# Patient Record
Sex: FEMALE | Race: WHITE | NOT HISPANIC OR LATINO | Employment: OTHER | URBAN - METROPOLITAN AREA
[De-identification: names, ages, dates, MRNs, and addresses within clinical notes are randomized per-mention and may not be internally consistent; named-entity substitution may affect disease eponyms.]

---

## 2017-08-18 ENCOUNTER — HISTORICAL (OUTPATIENT)
Dept: ADMINISTRATIVE | Facility: HOSPITAL | Age: 62
End: 2017-08-18

## 2017-08-18 LAB
ABS NEUT (OLG): 3.99 X10(3)/MCL (ref 2.1–9.2)
ALBUMIN SERPL-MCNC: 3.6 GM/DL (ref 3.4–5)
ALBUMIN/GLOB SERPL: 1.1 RATIO (ref 1.1–2)
ALP SERPL-CCNC: 53 UNIT/L (ref 38–126)
ALT SERPL-CCNC: 19 UNIT/L (ref 12–78)
APPEARANCE, UA: CLEAR
AST SERPL-CCNC: 15 UNIT/L (ref 15–37)
BACTERIA SPEC CULT: ABNORMAL /HPF
BASOPHILS # BLD AUTO: 0 X10(3)/MCL (ref 0–0.2)
BASOPHILS NFR BLD AUTO: 0 %
BILIRUB SERPL-MCNC: 0.6 MG/DL (ref 0.2–1)
BILIRUB UR QL STRIP: NEGATIVE
BILIRUBIN DIRECT+TOT PNL SERPL-MCNC: 0.1 MG/DL (ref 0–0.5)
BILIRUBIN DIRECT+TOT PNL SERPL-MCNC: 0.5 MG/DL (ref 0–0.8)
BUN SERPL-MCNC: 21 MG/DL (ref 7–18)
CALCIUM SERPL-MCNC: 8.8 MG/DL (ref 8.5–10.1)
CHLORIDE SERPL-SCNC: 109 MMOL/L (ref 98–107)
CHOLEST SERPL-MCNC: 223 MG/DL (ref 0–200)
CHOLEST/HDLC SERPL: 2.9 {RATIO} (ref 0–4)
CO2 SERPL-SCNC: 26 MMOL/L (ref 21–32)
COLOR UR: ABNORMAL
CREAT SERPL-MCNC: 1.11 MG/DL (ref 0.55–1.02)
CRP SERPL HS-MCNC: 11.3 MG/L (ref 0–3)
DEPRECATED CALCIDIOL+CALCIFEROL SERPL-MC: 31.53 NG/ML (ref 30–80)
EOSINOPHIL # BLD AUTO: 0.1 X10(3)/MCL (ref 0–0.9)
EOSINOPHIL NFR BLD AUTO: 2 %
ERYTHROCYTE [DISTWIDTH] IN BLOOD BY AUTOMATED COUNT: 14 % (ref 11.5–17)
EST. AVERAGE GLUCOSE BLD GHB EST-MCNC: 103 MG/DL
FOLATE SERPL-MCNC: 23.6 NG/ML (ref 3.1–17.5)
GLOBULIN SER-MCNC: 3.4 GM/DL (ref 2.4–3.5)
GLUCOSE (UA): NEGATIVE
GLUCOSE SERPL-MCNC: 90 MG/DL (ref 74–106)
HBA1C MFR BLD: 5.2 % (ref 4.2–6.3)
HCT VFR BLD AUTO: 43.1 % (ref 37–47)
HDLC SERPL-MCNC: 76 MG/DL (ref 35–60)
HGB BLD-MCNC: 14.5 GM/DL (ref 12–16)
HGB UR QL STRIP: NEGATIVE
IRON SATN MFR SERPL: 31.3 % (ref 20–50)
IRON SERPL-MCNC: 94 MCG/DL (ref 50–175)
KETONES UR QL STRIP: ABNORMAL
LDLC SERPL CALC-MCNC: 128 MG/DL (ref 0–129)
LEUKOCYTE ESTERASE UR QL STRIP: ABNORMAL
LYMPHOCYTES # BLD AUTO: 1.1 X10(3)/MCL (ref 0.6–4.6)
LYMPHOCYTES NFR BLD AUTO: 19 %
MCH RBC QN AUTO: 34.1 PG (ref 27–31)
MCHC RBC AUTO-ENTMCNC: 33.6 GM/DL (ref 33–36)
MCV RBC AUTO: 101.4 FL (ref 80–94)
MONOCYTES # BLD AUTO: 0.4 X10(3)/MCL (ref 0.1–1.3)
MONOCYTES NFR BLD AUTO: 8 %
NEUTROPHILS # BLD AUTO: 3.99 X10(3)/MCL (ref 1.4–7.9)
NEUTROPHILS NFR BLD AUTO: 70 %
NITRITE UR QL STRIP: NEGATIVE
PH UR STRIP: 6 [PH] (ref 5–9)
PLATELET # BLD AUTO: 232 X10(3)/MCL (ref 130–400)
PMV BLD AUTO: 9.1 FL (ref 9.4–12.4)
POTASSIUM SERPL-SCNC: 3.9 MMOL/L (ref 3.5–5.1)
PROT SERPL-MCNC: 7 GM/DL (ref 6.4–8.2)
PROT UR QL STRIP: NEGATIVE
RBC # BLD AUTO: 4.25 X10(6)/MCL (ref 4.2–5.4)
RBC #/AREA URNS HPF: 13 /HPF (ref 0–2)
SODIUM SERPL-SCNC: 144 MMOL/L (ref 136–145)
SP GR UR STRIP: 1.03 (ref 1–1.03)
SQUAMOUS EPITHELIAL, UA: ABNORMAL
T3FREE SERPL-MCNC: 2.48 PG/ML (ref 2.18–3.98)
T4 FREE SERPL-MCNC: 1.05 NG/DL (ref 0.76–1.46)
TIBC SERPL-MCNC: 300 MCG/DL (ref 250–450)
TRANSFERRIN SERPL-MCNC: 249 MG/DL (ref 200–360)
TRIGL SERPL-MCNC: 97 MG/DL (ref 30–150)
TSH SERPL-ACNC: 5.13 MIU/ML (ref 0.36–3.74)
UROBILINOGEN UR STRIP-ACNC: 0.2
VIT B12 SERPL-MCNC: 2329 PG/ML (ref 193–986)
VLDLC SERPL CALC-MCNC: 19 MG/DL
WBC # SPEC AUTO: 5.7 X10(3)/MCL (ref 4.5–11.5)
WBC #/AREA URNS HPF: 41 /HPF (ref 0–3)

## 2017-08-20 LAB — FINAL CULTURE: NO GROWTH

## 2017-08-30 ENCOUNTER — HISTORICAL (OUTPATIENT)
Dept: RADIOLOGY | Facility: HOSPITAL | Age: 62
End: 2017-08-30

## 2017-08-31 ENCOUNTER — TELEPHONE (OUTPATIENT)
Dept: HEMATOLOGY/ONCOLOGY | Facility: CLINIC | Age: 62
End: 2017-08-31

## 2017-08-31 NOTE — TELEPHONE ENCOUNTER
Spoke with patient's  (who is also a patient of Dr Rios).  Told him I tried calling Dr MIRTA Barron's office but no answer.  Will fax records request using fax # on website.   will try to get copies of records also.  (He was told [employer] has to send Release form).    The couple resides in [place] but willing to come here for tx.  They have relatives who live closeby. Wife dx'd with breast ca in approx 2013 & had lumpectomy.  She then was put on tamoxifen but was dc'd as she was not able to tolerate it.  They are worried that she is not on any tx.  Oncologist is not always accessible & that is why they want to transfer care.    Will continue working on getting records & keep in touch with patient.  =================================================    ----- Message from Ilda Frost sent at 8/31/2017 10:31 AM CDT -----  Contact: Spouse  I see someone outside our dept had scheduled this consult with Dr. Rios on 9/14 at [facility].   Pt spouse stated that [facility] has to obtain medical records from [place] oncologist.   Informed pt spouse would get him in touch with one of our nurse navigators.   Thanks!    ----- Message -----  From: Mario Hammond  Sent: 8/31/2017   9:41 AM  To: [facility] Staff    Needs a request to release medical records     Anderson Severino MD:: [phone]    Pt contact::[phone]

## 2017-08-31 NOTE — TELEPHONE ENCOUNTER
Returned call to pt spouse.   Spouse stated that his wife is scheduled for a consult with Dr. Rios on 9/14 and that [place] oncologist stated [facility] has to obtain records for this appt.   Informed spouse that would send a message to our nurse navigator to help assist.   Spouse verbalized understanding.       ----- Message from Mario Hammond sent at 8/31/2017  9:41 AM CDT -----  Contact: Spouse  Needs a request to release medical records     Anderson Severino MD:: [phone]    Pt contact::[phone]

## 2017-08-31 NOTE — LETTER
Fax Transmission                                                                                                                                                       Date: 2017       To:  [facility]         (Dr Chancellor Barron) From:  Estrellita Fabian RN              Oncology Navigator   Fax:  [phone] Fax:    [phone]   Phone:  [phone] Phone: [phone]       This is a request for medical records of   JERMAINE CRESPO 1955.                              If there are any problems with this transmission, please call immediately. Thank you.    Confidentiality notice: The accompanying facsimile is intended solely for the use of the recipient designated above. Document(s) transmitted herewith may contain information that is confidential and privileged. Delivery, distribution or dissemination of this communication other than to the intended recipient is strictly prohibited. If you have received this facsimile in error, please notify [facility]'s Corporate Integrity Department immediately by telephone at [phone].

## 2017-09-12 ENCOUNTER — TELEPHONE (OUTPATIENT)
Dept: HEMATOLOGY/ONCOLOGY | Facility: CLINIC | Age: 62
End: 2017-09-12

## 2017-09-12 NOTE — TELEPHONE ENCOUNTER
9/12/17      Informed Omar that Dr Barron's office called today and they were going to fax me whatever records they have but may need more data from Faby Jimenez [phone].  Omar stated he has copy of op report and discs of all mammograms & other radiology.  He will fax me what he has.  Will reassess tomorrow how much data we have.    =====================================  ----- Message from Tiffanie Balderas sent at 9/12/2017 11:25 AM CDT -----  Contact: spouse  Pt contact [phone]    Pt spoke with Ms Haro    Pt will be seen as a New pt on 09/14 by Dr Rios and the  will like to know if Medical Records were received and if not he has them and can bring them today if necessary     Pt also states he is 3hrs away but he will drive to bring them here     Please inform pt

## 2017-09-13 ENCOUNTER — TELEPHONE (OUTPATIENT)
Dept: HEMATOLOGY/ONCOLOGY | Facility: CLINIC | Age: 62
End: 2017-09-13

## 2017-09-13 NOTE — TELEPHONE ENCOUNTER
Informed Omar that so far we received mammograms [id number],  Op & path report 2014, March visit notes & 9/12/17 lab.  He will bring the radiology discs to Friday's visit.  Scanned these to system.  I faxed records request to Faby Jimenez this morning.

## 2017-09-13 NOTE — PROGRESS NOTES
CC:  Transferring care for breast cancer    Diagnosis:  Stage IA ([id number]) multifocal invasive ductal carcinoma of the left breast diagnosed on 5/6/2014, ER/AL+, HER2 neg.     Treatment history:   1. Core needle biopsy of left breast lesion on 4/30/14 showed invasive ductal carcinoma, grade 2, ER/AL positive, HER2 negative.   2. Ms. Wakler underwent left lumpectomy + SLND on 5/6/2014. Pathology showed invasive ductal carcinoma, multifocal, largest 0.55 cm, grade 2, a second one 0.2 cm, a third one 0.1 cm, negative margin. DCIS (+margin). Four sentinel lymph nodes were removed and negative for malignancy.   3. Radiation of 61.2Gy to left breast 6/17/14-8/5/14.  4. Endocrine therapy with tamoxifen, letrozole and anastrozole, stopped due to severe myalgias  5. Most recently mammogram on 5/23/2017 showed bilateral breast implants, negative for malignancy.     HPI:  Ms. Walker is a very pleasant 61 yo  woman with HTN, GERD, asthma, history of early stage left breast cancer, who presents today to establish care with us. She was initially found to have an abnormal mamogram in April 2014 and underwent a core biopsy of the left breast lesion on 4/30/2014. Pathology showed grade 2 IDC, as per office note her tumor was ER/AL positive, HER2 negative. She then underwent left lumpectomy and sentinel lymph node biopsy on 5/6/2014.  Pathology showed invasive ductal carcinoma, multifocal, largest 0.55 cm, grade 2, a second one 0.2 cm, a third one 0.1 cm, negative margin. DCIS (+margin). Four sentinel lymph nodes were removed and negative for malignancy. She received adjuvant radiation from 6/17/14 to 8/5/14. Tamoxifen, letrozole, and anastrozole were tried in the past, all were stopped due to myalgias. She was on endocrine therapy for less than one year in total. She currently feels well, denies fever, chills, malaise, significant weight loss.  She is active and works as nurse.  She feels depressed and stressed because of  her 's illness. But denies SI/HI. She was taking Boniva for osteopenia before, but was taken off it because her most recent bone density test showed a good result.         Past Medical History:   Diagnosis Date    Allergy     Anxiety     Asthma     Breast cancer     CHF (congestive heart failure)     Depression     Herpes genitalis in women     Hypertension     Sleep apnea     Thyroid disease      PSH:  L lumpectomy in . B/L breast implants.     Family history:  Her maternal aunt had breast cancer diagnosed in her 50s. One maternal niece has breast cancer in her 60s.     OB/GYN:  . Last menses at age 57. Was on HRT from age 41 to age 57.     Social History     Social History    Marital status:      Spouse name: N/A    Number of children: N/A    Years of education: N/A     Occupational History    Not on file.     Social History Main Topics    Smoking status: Not on file    Smokeless tobacco: Not on file    Alcohol use Not on file    Drug use: Unknown    Sexual activity: Not on file     Other Topics Concern    Not on file     Social History Narrative    No narrative on file       Review of Systems   Constitutional: Negative for activity change, appetite change, chills, diaphoresis, fatigue, fever and unexpected weight change.   HENT: Negative for congestion, facial swelling, mouth sores, nosebleeds, tinnitus, trouble swallowing and voice change.    Eyes: Negative for pain, redness and visual disturbance.   Respiratory: Negative for cough, choking, chest tightness, shortness of breath, wheezing and stridor.    Cardiovascular: Negative for chest pain, palpitations and leg swelling.   Gastrointestinal: Negative for abdominal distention, abdominal pain, blood in stool, constipation, diarrhea, nausea and vomiting.   Endocrine: Negative for polydipsia, polyphagia and polyuria.   Genitourinary: Negative for difficulty urinating, flank pain, frequency and hematuria.    Musculoskeletal: Negative for arthralgias, back pain, joint swelling, myalgias, neck pain and neck stiffness.   Skin: Negative for color change, pallor and rash.   Neurological: Positive for headaches (occasional). Negative for dizziness, tremors, syncope, weakness, light-headedness and numbness.   Hematological: Negative for adenopathy. Does not bruise/bleed easily.   Psychiatric/Behavioral: Positive for dysphoric mood. Negative for agitation, confusion and self-injury. The patient is not nervous/anxious.        Objective:  Physical Exam   Constitutional: She is oriented to person, place, and time. She appears well-developed and well-nourished. No distress.   HENT:   Head: Normocephalic and atraumatic.   Mouth/Throat: Oropharynx is clear and moist. No oropharyngeal exudate.   Eyes: Conjunctivae and EOM are normal. Pupils are equal, round, and reactive to light. No scleral icterus.   Neck: Normal range of motion. Neck supple. No JVD present. No thyromegaly present.   Cardiovascular: Normal rate, regular rhythm, normal heart sounds and intact distal pulses.  Exam reveals no gallop and no friction rub.    No murmur heard.  Pulmonary/Chest: Effort normal and breath sounds normal. No respiratory distress. She has no wheezes. She has no rales. Right breast exhibits no inverted nipple, no mass, no nipple discharge, no skin change and no tenderness. Left breast exhibits no inverted nipple, no mass, no nipple discharge, no skin change and no tenderness. Breasts are symmetrical.   Abdominal: Soft. Bowel sounds are normal. She exhibits no distension and no mass. There is no hepatosplenomegaly. There is no tenderness. There is no rebound. No hernia.   Musculoskeletal: Normal range of motion. She exhibits no edema, tenderness or deformity.   Lymphadenopathy:     She has no cervical adenopathy.        Right axillary: No pectoral and no lateral adenopathy present.        Left axillary: No pectoral and no lateral adenopathy  present.       Right: No supraclavicular adenopathy present.        Left: No supraclavicular adenopathy present.   Neurological: She is alert and oriented to person, place, and time. No cranial nerve deficit. She exhibits normal muscle tone.   Skin: Skin is warm and dry. No rash noted. She is not diaphoretic. No erythema. No pallor.   Psychiatric: She has a normal mood and affect. Her behavior is normal. Judgment and thought content normal.       Labs:  I personally reviewed her CBC and CMP on 3/2/2017, unremarkable. Cr 1.05, vit B12 level > 2000    Bone density June 2016 showed osteopenia, worse from before      Assessment and Plan:  1. Malignant neoplasm of left breast in female, estrogen receptor positive, unspecified site of breast    2. Encounter for monitoring aromatase inhibitor therapy    3. Osteopenia, unspecified location        - Ms. Walker is a very pleasant 63 yo  woman who was diagnosed with stage IA IDC of the left breast 3 years ago. She was treated with lumpectomy followed by radiation. Given that she has tried tamoxifen, letrozole, and anastrozole in the past but could not tolerate them due to severe myalgias, adjuvant endocrine therapy with exemestane is medically necessary in her case as it is the only endocrine therapy drug left that can reduce her risk of recurrent breast cancer.  She is concerned about recurrence since she is not taking endocrine therapy. I discussed with her that we can try exemestane to see if she can tolerate it. I will also check vitamin D level and give her high dose vitamin D if it is low. I also discussed with her that daily exercise and vitamin D helps alleviate endocrine therapy-related myalgias. Her insurance is not covering for exemestane. EXEMESTANE FOR 5 YEARS AS ADJUVANT ENDOCRINE THERAPY IS NECESSARY IN HER CASE AS SHE CANNOT TOLERATE OTHER ENDOCRINE THERAPY DRUGS. We will try to get insurance approval for her.   - will try to get her most recent bone  density from her PCP's office. If still has osteopenia will start zometa/XGeva  - check vitamin D today, if low will give high dose vitamin D weekly  - Discussed benefits with daily exercise, baby aspirin, and vitamin D  - RTC in 7 weeks

## 2017-09-14 ENCOUNTER — INITIAL CONSULT (OUTPATIENT)
Dept: HEMATOLOGY/ONCOLOGY | Facility: CLINIC | Age: 62
End: 2017-09-14
Payer: COMMERCIAL

## 2017-09-14 ENCOUNTER — LAB VISIT (OUTPATIENT)
Dept: LAB | Facility: OTHER | Age: 62
End: 2017-09-14
Attending: INTERNAL MEDICINE
Payer: COMMERCIAL

## 2017-09-14 VITALS
WEIGHT: 255.75 LBS | TEMPERATURE: 96 F | HEART RATE: 63 BPM | SYSTOLIC BLOOD PRESSURE: 113 MMHG | HEIGHT: 64 IN | RESPIRATION RATE: 18 BRPM | OXYGEN SATURATION: 96 % | BODY MASS INDEX: 43.66 KG/M2 | DIASTOLIC BLOOD PRESSURE: 68 MMHG

## 2017-09-14 DIAGNOSIS — Z79.811 ENCOUNTER FOR MONITORING AROMATASE INHIBITOR THERAPY: ICD-10-CM

## 2017-09-14 DIAGNOSIS — Z17.0 MALIGNANT NEOPLASM OF LEFT BREAST IN FEMALE, ESTROGEN RECEPTOR POSITIVE, UNSPECIFIED SITE OF BREAST: Primary | ICD-10-CM

## 2017-09-14 DIAGNOSIS — Z51.81 ENCOUNTER FOR MONITORING AROMATASE INHIBITOR THERAPY: ICD-10-CM

## 2017-09-14 DIAGNOSIS — C50.912 MALIGNANT NEOPLASM OF LEFT BREAST IN FEMALE, ESTROGEN RECEPTOR POSITIVE, UNSPECIFIED SITE OF BREAST: Primary | ICD-10-CM

## 2017-09-14 DIAGNOSIS — C50.912 MALIGNANT NEOPLASM OF LEFT BREAST IN FEMALE, ESTROGEN RECEPTOR POSITIVE, UNSPECIFIED SITE OF BREAST: ICD-10-CM

## 2017-09-14 DIAGNOSIS — M85.80 OSTEOPENIA, UNSPECIFIED LOCATION: ICD-10-CM

## 2017-09-14 DIAGNOSIS — Z17.0 MALIGNANT NEOPLASM OF LEFT BREAST IN FEMALE, ESTROGEN RECEPTOR POSITIVE, UNSPECIFIED SITE OF BREAST: ICD-10-CM

## 2017-09-14 LAB
25(OH)D3+25(OH)D2 SERPL-MCNC: 31 NG/ML
ALBUMIN SERPL BCP-MCNC: 3.7 G/DL
ALP SERPL-CCNC: 59 U/L
ALT SERPL W/O P-5'-P-CCNC: 14 U/L
ANION GAP SERPL CALC-SCNC: 10 MMOL/L
AST SERPL-CCNC: 17 U/L
BASOPHILS # BLD AUTO: 0.02 K/UL
BASOPHILS NFR BLD: 0.3 %
BILIRUB SERPL-MCNC: 0.4 MG/DL
BUN SERPL-MCNC: 30 MG/DL
CALCIUM SERPL-MCNC: 10 MG/DL
CHLORIDE SERPL-SCNC: 109 MMOL/L
CO2 SERPL-SCNC: 23 MMOL/L
CREAT SERPL-MCNC: 1.2 MG/DL
DIFFERENTIAL METHOD: ABNORMAL
EOSINOPHIL # BLD AUTO: 0.1 K/UL
EOSINOPHIL NFR BLD: 1.2 %
ERYTHROCYTE [DISTWIDTH] IN BLOOD BY AUTOMATED COUNT: 14.1 %
EST. GFR  (AFRICAN AMERICAN): 56 ML/MIN/1.73 M^2
EST. GFR  (NON AFRICAN AMERICAN): 49 ML/MIN/1.73 M^2
GLUCOSE SERPL-MCNC: 90 MG/DL
HCT VFR BLD AUTO: 41.6 %
HGB BLD-MCNC: 13.9 G/DL
LYMPHOCYTES # BLD AUTO: 1.5 K/UL
LYMPHOCYTES NFR BLD: 23.4 %
MCH RBC QN AUTO: 32.9 PG
MCHC RBC AUTO-ENTMCNC: 33.4 G/DL
MCV RBC AUTO: 99 FL
MONOCYTES # BLD AUTO: 0.3 K/UL
MONOCYTES NFR BLD: 5.3 %
NEUTROPHILS # BLD AUTO: 4.5 K/UL
NEUTROPHILS NFR BLD: 69.6 %
PLATELET # BLD AUTO: 260 K/UL
PMV BLD AUTO: 9.9 FL
POTASSIUM SERPL-SCNC: 3.8 MMOL/L
PROT SERPL-MCNC: 7.4 G/DL
RBC # BLD AUTO: 4.22 M/UL
SODIUM SERPL-SCNC: 142 MMOL/L
WBC # BLD AUTO: 6.41 K/UL

## 2017-09-14 PROCEDURE — 3008F BODY MASS INDEX DOCD: CPT | Mod: S$GLB,,, | Performed by: INTERNAL MEDICINE

## 2017-09-14 PROCEDURE — 99999 PR PBB SHADOW E&M-EST. PATIENT-LVL III: CPT | Mod: PBBFAC,,, | Performed by: INTERNAL MEDICINE

## 2017-09-14 PROCEDURE — 99204 OFFICE O/P NEW MOD 45 MIN: CPT | Mod: S$GLB,,, | Performed by: INTERNAL MEDICINE

## 2017-09-14 PROCEDURE — 36415 COLL VENOUS BLD VENIPUNCTURE: CPT

## 2017-09-14 PROCEDURE — 85025 COMPLETE CBC W/AUTO DIFF WBC: CPT

## 2017-09-14 PROCEDURE — 82306 VITAMIN D 25 HYDROXY: CPT

## 2017-09-14 PROCEDURE — 80053 COMPREHEN METABOLIC PANEL: CPT

## 2017-09-14 RX ORDER — METRONIDAZOLE 10 MG/G
GEL TOPICAL 2 TIMES DAILY
COMMUNITY

## 2017-09-14 RX ORDER — PANTOPRAZOLE SODIUM 40 MG/1
40 TABLET, DELAYED RELEASE ORAL DAILY
COMMUNITY
End: 2018-12-29

## 2017-09-14 RX ORDER — NITROGLYCERIN 0.4 MG/1
0.4 TABLET SUBLINGUAL
Refills: 10 | COMMUNITY
Start: 2017-06-28 | End: 2018-08-29 | Stop reason: SDUPTHER

## 2017-09-14 RX ORDER — MONTELUKAST SODIUM 10 MG/1
10 TABLET ORAL NIGHTLY
COMMUNITY
End: 2018-08-29 | Stop reason: SDUPTHER

## 2017-09-14 RX ORDER — SPIRONOLACTONE 25 MG/1
25 TABLET ORAL DAILY
COMMUNITY
End: 2018-08-29 | Stop reason: SDUPTHER

## 2017-09-14 RX ORDER — RANOLAZINE 500 MG/1
1000 TABLET, EXTENDED RELEASE ORAL 2 TIMES DAILY
COMMUNITY
End: 2018-08-29 | Stop reason: SDUPTHER

## 2017-09-14 RX ORDER — DULOXETIN HYDROCHLORIDE 60 MG/1
60 CAPSULE, DELAYED RELEASE ORAL DAILY
COMMUNITY
End: 2018-08-29 | Stop reason: SDUPTHER

## 2017-09-14 RX ORDER — ACETAMINOPHEN 500 MG
500 TABLET ORAL EVERY 6 HOURS PRN
COMMUNITY

## 2017-09-14 RX ORDER — NEBIVOLOL 5 MG/1
5 TABLET ORAL DAILY
COMMUNITY
End: 2018-08-29 | Stop reason: SDUPTHER

## 2017-09-14 RX ORDER — MULTIVIT WITH MINERALS/HERBS
1 TABLET ORAL DAILY
COMMUNITY

## 2017-09-14 RX ORDER — FUROSEMIDE 20 MG/1
40 TABLET ORAL EVERY MORNING
COMMUNITY
End: 2018-08-29 | Stop reason: SDUPTHER

## 2017-09-14 RX ORDER — FERROUS SULFATE 325(65) MG
100 TABLET, DELAYED RELEASE (ENTERIC COATED) ORAL DAILY
COMMUNITY
End: 2018-08-29 | Stop reason: SDUPTHER

## 2017-09-14 RX ORDER — EXEMESTANE 25 MG/1
25 TABLET ORAL DAILY
Qty: 30 TABLET | Refills: 2 | Status: SHIPPED | OUTPATIENT
Start: 2017-09-14 | End: 2017-09-14

## 2017-09-14 RX ORDER — POTASSIUM CHLORIDE 750 MG/1
10 CAPSULE, EXTENDED RELEASE ORAL 2 TIMES DAILY
COMMUNITY
End: 2018-11-13

## 2017-09-14 RX ORDER — CLOPIDOGREL BISULFATE 75 MG/1
75 TABLET ORAL DAILY
COMMUNITY
End: 2018-09-12 | Stop reason: SDUPTHER

## 2017-09-14 RX ORDER — TOPIRAMATE 25 MG/1
25 TABLET ORAL NIGHTLY
COMMUNITY
End: 2018-08-29 | Stop reason: SDUPTHER

## 2017-09-14 RX ORDER — LORAZEPAM 0.5 MG/1
1 TABLET ORAL NIGHTLY
COMMUNITY
End: 2018-09-12 | Stop reason: SDUPTHER

## 2017-09-14 RX ORDER — VALACYCLOVIR HYDROCHLORIDE 1 G/1
1000 TABLET, FILM COATED ORAL 2 TIMES DAILY
COMMUNITY

## 2017-09-14 RX ORDER — WITCH HAZEL 50 %
2000 PADS, MEDICATED (EA) TOPICAL DAILY
COMMUNITY

## 2017-09-14 NOTE — PATIENT INSTRUCTIONS
1. Start taking exemestane one tablet a day. If it is too expensive, call me and let me know.   2. Start taking enteric coated baby aspirin 81 mg once daily with food.  3. Exercise (e.g. Walking for 20 minutes) daily. It helps with the bone, the muscle aches, and help preventing cancer.   4. Vitamin D daily  5. One extra portion of vegetables/fruits every day

## 2017-09-15 ENCOUNTER — TELEPHONE (OUTPATIENT)
Dept: HEMATOLOGY/ONCOLOGY | Facility: CLINIC | Age: 62
End: 2017-09-15

## 2017-09-15 NOTE — TELEPHONE ENCOUNTER
Duplicate---see previous phone documentation.     ----- Message from Patricia Byrnes sent at 9/12/2017 12:44 PM CDT -----  Contact: Pt's  Mr. Walker [phone]  Pt's  is requesting a call back from the nurse regarding the pt's medical records and the best way to get them to the office quickly as the pt is scheduled for 9.14.17.    Mr. Walker may be reached at [phone].    Thank you.  LC

## 2017-09-15 NOTE — TELEPHONE ENCOUNTER
Duplicate---see previous phone documentation.       ----- Message from Timo Tijerina sent at 9/12/2017 11:00 AM CDT -----  Contact: Berta with Dr Gato Beth  x_ 1st Request   _ 2nd Request   _ 3rd Request     Who: POORNIMA WILKINS [[id number]]    Why: Berta is requesting a call back in reference to obtaining a release from the patient for medical records to be sent.  Pt will be in on 9/14 for consultation.  You can fax the release to [phone].    What Number to Call Back: [phone]    When to Expect a call back: (Before the end of the day)   -- if call after 3:00 call back will be tomorrow.

## 2017-09-15 NOTE — TELEPHONE ENCOUNTER
Returned call to pt spouse.   Informed pt spouse that MA out on vacation and just seeing message.   Pt spouse stated that all issues with medical records straightened out and no needs at this time.   Pt spouse thanked nurse for calling and following up.       ----- Message from Mirtha Salas sent at 9/12/2017  1:21 PM CDT -----  x 1st Request  _ 2nd Request  _ 3rd Request    Who: pt spouse Brittany    Why: Pt spouse is requesting to speak to nurse in regards to medical records for his wife. Please call and advise.     What Number to Call Back: [phone]     When to Expect a call back: (Before the end of the day)  -- if call after 3:00 call back will be tomorrow.

## 2017-09-20 ENCOUNTER — TELEPHONE (OUTPATIENT)
Dept: HEMATOLOGY/ONCOLOGY | Facility: CLINIC | Age: 62
End: 2017-09-20

## 2017-09-20 NOTE — TELEPHONE ENCOUNTER
----- Message from Mario Gutierrez sent at 9/20/2017 12:08 PM CDT -----  Contact: Patient  x_  1st Request  _  2nd Request  _  3rd Request        Who: POORNIMA WILKINS [[id number]]    Why:  Returning a call back from your office. Please call at earliest convenience.     Thanks !     What Number to Call Back:[phone] (M)    When to Expect a call back: (With in 24 hours)

## 2017-09-20 NOTE — TELEPHONE ENCOUNTER
Returned call to Ms Walker. No answer. Unable to leave message on voicemail box. Mailbox has not been set up.

## 2017-10-27 NOTE — PROGRESS NOTES
PROGRESS NOTE    Subjective:       Patient ID: Faby Walker is a 62 y.o. female.    Chief Complaint: follow up for breast cancer    Diagnosis:  Stage IA ([id number]) multifocal invasive ductal carcinoma of the left breast diagnosed on 5/6/2014, ER/WV+, HER2 neg.      Treatment history:   1. Core needle biopsy of left breast lesion on 4/30/14 showed invasive ductal carcinoma, grade 2, ER positive (78.9%), WV positive (79.7%), HER2 negative (0 by IHC). Ki-67 22.4%.    2. Ms. Walker underwent left lumpectomy + SLND on 5/6/2014. Pathology showed invasive ductal carcinoma, multifocal, largest 0.55 cm, grade 2, a second one 0.2 cm, a third one 0.1 cm, negative margin. DCIS (+margin). Four sentinel lymph nodes were removed and negative for malignancy.   3. Radiation of 61.2Gy to left breast 6/17/14-8/5/14.  4. Endocrine therapy with tamoxifen, letrozole and anastrozole, stopped due to severe myalgias  5. Most recently mammogram on 5/23/2017 showed bilateral breast implants, negative for malignancy.   6. Started on exemestane after transferring her care to me in September 2017.     History of Present Illness:   Ms. Walker is a very pleasant 61 yo  woman with HTN, GERD, asthma, history of early stage left breast cancer, who presents today for follow up. She started taking exemestane after she last saw me. She has not noticed any side effects from exemestane. She is taking aspirin and vitamin D. She does not take calcium because she had kidney stones in the past and was told to avoid calcium. Her depression and anxiety has gotten better since she started taking Ativan twice a day. She denies other complaints.     ROS:   A ten-point system review is obtained and negative except for what was stated in the HPI.     Physical Examination:   Vital signs reviewed.   Gen: well hydrated, well developed, in no acute distress.  HEENT: normocephalic, anicteric sclerae,  PERRLA, EOMI, oropharynx clear  Neck: supple, no JVD, thyromegaly, cervical or supraclavicular LAD  Lungs: CTAB, no wheezes or rales  Heart: RRR, no M/R/G  Breasts: bilateral breasts no abnormal skin changes, nodules, masses, or axillary lympadenopathy  Abdomen: soft, no tenderness, non-distended, no hepatosplenomegaly, mass, or hernia. BS present  Ext: no clubbing, cyanosis, or edema  Neuro: alert and oriented x 4, no focal neuro deficit  Skin: no rash, erythema, open wound or ulcers  Psych: pleasant and appropriate mood and affect      IMAGING DATA:  DEXA scan on 8/15/2017 reviewed. T score of lumbar spine -1.4, consistent with osteopenia and increased risk of fracture.         Assessment/Plan:     1. Malignant neoplasm of left breast in female, estrogen receptor positive, unspecified site of breast    2. Aromatase inhibitor use    3. Osteopenia of spine      - MsScott Walker is a very pleasant 63 yo  woman with HTN, GERD, asthma, history of early stage left breast cancer, who presents today for follow up.  - Continue with exemestane  - She has been on Boniva for 6-8 months but still has osteopenia. She has two bone fractures in the past. Now that she is on exemestane, her osteopenia is going to worsen. I will start her on Zometa once every 6 months for 6 doses for cancer treatment-induced bone loss. The side effects of zometa were discussed with her, which include but are not limited to infusion reaction, fever, joint swelling, low calcium level, and osteonecrosis of the jaw. She understands and agrees with the plan.  - Continue vit D, aspirin. Encourage daily exercise  - RTC in 3 months. She will get Zometa when she returns.     Electronically signed by Aden Rios

## 2017-10-30 ENCOUNTER — OFFICE VISIT (OUTPATIENT)
Dept: HEMATOLOGY/ONCOLOGY | Facility: CLINIC | Age: 62
End: 2017-10-30
Payer: COMMERCIAL

## 2017-10-30 VITALS
TEMPERATURE: 98 F | DIASTOLIC BLOOD PRESSURE: 74 MMHG | HEART RATE: 58 BPM | SYSTOLIC BLOOD PRESSURE: 113 MMHG | WEIGHT: 243.63 LBS | BODY MASS INDEX: 41.82 KG/M2 | RESPIRATION RATE: 18 BRPM

## 2017-10-30 DIAGNOSIS — M85.88 OSTEOPENIA OF SPINE: ICD-10-CM

## 2017-10-30 DIAGNOSIS — T14.8XXA BONE FRACTURE: ICD-10-CM

## 2017-10-30 DIAGNOSIS — Z79.811 AROMATASE INHIBITOR USE: ICD-10-CM

## 2017-10-30 DIAGNOSIS — Z17.0 MALIGNANT NEOPLASM OF LEFT BREAST IN FEMALE, ESTROGEN RECEPTOR POSITIVE, UNSPECIFIED SITE OF BREAST: Primary | ICD-10-CM

## 2017-10-30 DIAGNOSIS — C50.912 MALIGNANT NEOPLASM OF LEFT BREAST IN FEMALE, ESTROGEN RECEPTOR POSITIVE, UNSPECIFIED SITE OF BREAST: Primary | ICD-10-CM

## 2017-10-30 PROCEDURE — 99999 PR PBB SHADOW E&M-EST. PATIENT-LVL II: CPT | Mod: PBBFAC,,, | Performed by: INTERNAL MEDICINE

## 2017-10-30 PROCEDURE — 99214 OFFICE O/P EST MOD 30 MIN: CPT | Mod: S$GLB,,, | Performed by: INTERNAL MEDICINE

## 2017-10-30 RX ORDER — LEVOTHYROXINE SODIUM 150 UG/1
150 TABLET ORAL DAILY
COMMUNITY
End: 2018-08-29 | Stop reason: SDUPTHER

## 2017-10-30 RX ORDER — HEPARIN 100 UNIT/ML
500 SYRINGE INTRAVENOUS
Status: CANCELLED | OUTPATIENT
Start: 2017-10-30

## 2017-10-30 RX ORDER — SODIUM CHLORIDE 0.9 % (FLUSH) 0.9 %
10 SYRINGE (ML) INJECTION
Status: CANCELLED | OUTPATIENT
Start: 2017-10-30

## 2017-10-30 RX ORDER — AMOXICILLIN 875 MG/1
875 TABLET, FILM COATED ORAL
COMMUNITY
End: 2018-12-29

## 2017-11-30 ENCOUNTER — HISTORICAL (OUTPATIENT)
Dept: ADMINISTRATIVE | Facility: HOSPITAL | Age: 62
End: 2017-11-30

## 2017-11-30 LAB
ABS NEUT (OLG): 5.29 X10(3)/MCL (ref 2.1–9.2)
ALBUMIN SERPL-MCNC: 3.5 GM/DL (ref 3.4–5)
ALBUMIN/GLOB SERPL: 0.9 RATIO (ref 1.1–2)
ALP SERPL-CCNC: 53 UNIT/L (ref 38–126)
ALT SERPL-CCNC: 18 UNIT/L (ref 12–78)
APPEARANCE, UA: CLEAR
AST SERPL-CCNC: 12 UNIT/L (ref 15–37)
BACTERIA SPEC CULT: ABNORMAL /HPF
BASOPHILS # BLD AUTO: 0 X10(3)/MCL (ref 0–0.2)
BASOPHILS NFR BLD AUTO: 0 %
BILIRUB SERPL-MCNC: 0.5 MG/DL (ref 0.2–1)
BILIRUB UR QL STRIP: NEGATIVE
BILIRUBIN DIRECT+TOT PNL SERPL-MCNC: 0.1 MG/DL (ref 0–0.5)
BILIRUBIN DIRECT+TOT PNL SERPL-MCNC: 0.4 MG/DL (ref 0–0.8)
BUN SERPL-MCNC: 21 MG/DL (ref 7–18)
CALCIUM SERPL-MCNC: 9.2 MG/DL (ref 8.5–10.1)
CHLORIDE SERPL-SCNC: 108 MMOL/L (ref 98–107)
CO2 SERPL-SCNC: 26 MMOL/L (ref 21–32)
COLOR UR: YELLOW
CREAT SERPL-MCNC: 1.08 MG/DL (ref 0.55–1.02)
EOSINOPHIL # BLD AUTO: 0.1 X10(3)/MCL (ref 0–0.9)
EOSINOPHIL NFR BLD AUTO: 2 %
ERYTHROCYTE [DISTWIDTH] IN BLOOD BY AUTOMATED COUNT: 13.6 % (ref 11.5–17)
GLOBULIN SER-MCNC: 3.7 GM/DL (ref 2.4–3.5)
GLUCOSE (UA): NEGATIVE
GLUCOSE SERPL-MCNC: 89 MG/DL (ref 74–106)
HCT VFR BLD AUTO: 40.2 % (ref 37–47)
HGB BLD-MCNC: 12.9 GM/DL (ref 12–16)
HGB UR QL STRIP: NEGATIVE
KETONES UR QL STRIP: NEGATIVE
LEUKOCYTE ESTERASE UR QL STRIP: NEGATIVE
LYMPHOCYTES # BLD AUTO: 1.3 X10(3)/MCL (ref 0.6–4.6)
LYMPHOCYTES NFR BLD AUTO: 18 %
MCH RBC QN AUTO: 33.2 PG (ref 27–31)
MCHC RBC AUTO-ENTMCNC: 32.1 GM/DL (ref 33–36)
MCV RBC AUTO: 103.6 FL (ref 80–94)
MONOCYTES # BLD AUTO: 0.4 X10(3)/MCL (ref 0.1–1.3)
MONOCYTES NFR BLD AUTO: 6 %
NEUTROPHILS # BLD AUTO: 5.29 X10(3)/MCL (ref 1.4–7.9)
NEUTROPHILS NFR BLD AUTO: 73 %
NITRITE UR QL STRIP: NEGATIVE
PH UR STRIP: 7.5 [PH] (ref 5–9)
PLATELET # BLD AUTO: 243 X10(3)/MCL (ref 130–400)
PMV BLD AUTO: 8.8 FL (ref 9.4–12.4)
POTASSIUM SERPL-SCNC: 4 MMOL/L (ref 3.5–5.1)
PROT SERPL-MCNC: 7.2 GM/DL (ref 6.4–8.2)
PROT UR QL STRIP: NEGATIVE
RBC # BLD AUTO: 3.88 X10(6)/MCL (ref 4.2–5.4)
RBC #/AREA URNS HPF: 6 /HPF (ref 0–2)
SODIUM SERPL-SCNC: 141 MMOL/L (ref 136–145)
SP GR UR STRIP: 1.02 (ref 1–1.03)
SQUAMOUS EPITHELIAL, UA: ABNORMAL
TSH SERPL-ACNC: 1.79 MIU/ML (ref 0.36–3.74)
UROBILINOGEN UR STRIP-ACNC: 1
WBC # SPEC AUTO: 7.2 X10(3)/MCL (ref 4.5–11.5)
WBC #/AREA URNS HPF: ABNORMAL /[HPF]

## 2017-12-07 ENCOUNTER — HISTORICAL (OUTPATIENT)
Dept: ADMINISTRATIVE | Facility: HOSPITAL | Age: 62
End: 2017-12-07

## 2017-12-07 LAB
APPEARANCE, UA: CLEAR
BACTERIA SPEC CULT: NORMAL /HPF
BILIRUB UR QL STRIP: NEGATIVE
COLOR UR: YELLOW
GLUCOSE (UA): NEGATIVE
HGB UR QL STRIP: NEGATIVE
KETONES UR QL STRIP: NEGATIVE
LEUKOCYTE ESTERASE UR QL STRIP: NEGATIVE
NITRITE UR QL STRIP: NEGATIVE
PH UR STRIP: 5 [PH] (ref 5–9)
PROT UR QL STRIP: NEGATIVE
RBC #/AREA URNS HPF: NORMAL /[HPF]
SP GR UR STRIP: 1.01 (ref 1–1.03)
SQUAMOUS EPITHELIAL, UA: NORMAL
UROBILINOGEN UR STRIP-ACNC: 0.2
WBC #/AREA URNS HPF: NORMAL /[HPF]

## 2018-01-02 ENCOUNTER — TELEPHONE (OUTPATIENT)
Dept: HEMATOLOGY/ONCOLOGY | Facility: CLINIC | Age: 63
End: 2018-01-02

## 2018-01-02 DIAGNOSIS — Z17.0 MALIGNANT NEOPLASM OF BREAST IN FEMALE, ESTROGEN RECEPTOR POSITIVE, UNSPECIFIED LATERALITY, UNSPECIFIED SITE OF BREAST: Primary | ICD-10-CM

## 2018-01-02 DIAGNOSIS — C50.919 MALIGNANT NEOPLASM OF BREAST IN FEMALE, ESTROGEN RECEPTOR POSITIVE, UNSPECIFIED LATERALITY, UNSPECIFIED SITE OF BREAST: Primary | ICD-10-CM

## 2018-01-02 RX ORDER — EXEMESTANE 25 MG/1
25 TABLET ORAL DAILY
Qty: 90 TABLET | Refills: 3 | Status: SHIPPED | OUTPATIENT
Start: 2018-01-02 | End: 2019-01-07 | Stop reason: SDUPTHER

## 2018-01-02 RX ORDER — EXEMESTANE 25 MG/1
TABLET ORAL
COMMUNITY
Start: 2017-10-17 | End: 2018-01-02 | Stop reason: SDUPTHER

## 2018-01-02 RX ORDER — EXEMESTANE 25 MG/1
TABLET ORAL
Status: CANCELLED | OUTPATIENT
Start: 2018-01-02

## 2018-01-02 NOTE — TELEPHONE ENCOUNTER
Spoke with Ms. Walker and she requested a prescription refill for her Aromasin and a prescription refill for Zofran for her  Melecio Walker.

## 2018-01-02 NOTE — TELEPHONE ENCOUNTER
----- Message from Chandan Gonsales sent at 1/2/2018  2:59 PM CST -----  Contact: Faby Walker      X_  1st Request  _  2nd Request  _  3rd Request    Please refill the medication(s) listed below. Please call the patient when the prescription(s) is ready for  at this phone number            Medication #1    exemestane (AROMASIN) 25 mg tablet (Discontinued) 30 tablet 2 9/14/2017 9/14/2017 --  Sig - Route: Take 1 tablet (25 mg total) by mouth once daily. - Oral  Class: Normal  Reason for Discontinue: Cost of medication  Order: [id number]  Date/Time Signed: 9/14/2017 14:24      E-Prescribing Status: Receipt confirmed by pharmacy (9/14/2017  2:24 PM CDT)        Medication #2      Preferred Pharmacy:    [id number] - [place] - [address] [phone] (Phone)  [phone] (Fax)

## 2018-01-02 NOTE — TELEPHONE ENCOUNTER
Spoke with Ms. Walker and she requested a prescription refill for her Aromasin and a prescription refill for Zofran for her  Melecio Walker. I informed Ms. Walker that I will let Dr. Rios know, pt verbalized understanding.

## 2018-01-03 ENCOUNTER — TELEPHONE (OUTPATIENT)
Dept: HEMATOLOGY/ONCOLOGY | Facility: CLINIC | Age: 63
End: 2018-01-03

## 2018-01-03 NOTE — TELEPHONE ENCOUNTER
Spoke with Ms. Walker and informed her that her Aromasin prescription refill was not approved. Pt verbalized understanding. I also informed the pt that Dr. Rios wanted to have her come in for a follow up appointment with labs on Monday January 8. 2018 at 9 am for the lab appt and 1030 am for her follow up appt with Dr. Rios. Ms. Walker verbalized understanding.

## 2018-01-08 ENCOUNTER — INFUSION (OUTPATIENT)
Dept: INFUSION THERAPY | Facility: HOSPITAL | Age: 63
End: 2018-01-08
Attending: INTERNAL MEDICINE
Payer: COMMERCIAL

## 2018-01-08 ENCOUNTER — OFFICE VISIT (OUTPATIENT)
Dept: HEMATOLOGY/ONCOLOGY | Facility: CLINIC | Age: 63
End: 2018-01-08
Payer: COMMERCIAL

## 2018-01-08 ENCOUNTER — LAB VISIT (OUTPATIENT)
Dept: LAB | Facility: OTHER | Age: 63
End: 2018-01-08
Attending: INTERNAL MEDICINE
Payer: COMMERCIAL

## 2018-01-08 VITALS
RESPIRATION RATE: 18 BRPM | SYSTOLIC BLOOD PRESSURE: 118 MMHG | HEART RATE: 65 BPM | TEMPERATURE: 98 F | DIASTOLIC BLOOD PRESSURE: 69 MMHG

## 2018-01-08 VITALS
RESPIRATION RATE: 17 BRPM | DIASTOLIC BLOOD PRESSURE: 71 MMHG | HEART RATE: 75 BPM | TEMPERATURE: 97 F | SYSTOLIC BLOOD PRESSURE: 122 MMHG | OXYGEN SATURATION: 96 % | BODY MASS INDEX: 43.67 KG/M2 | WEIGHT: 254.44 LBS

## 2018-01-08 DIAGNOSIS — Z17.0 MALIGNANT NEOPLASM OF OVERLAPPING SITES OF LEFT BREAST IN FEMALE, ESTROGEN RECEPTOR POSITIVE: Primary | ICD-10-CM

## 2018-01-08 DIAGNOSIS — Z17.0 MALIGNANT NEOPLASM OF BREAST IN FEMALE, ESTROGEN RECEPTOR POSITIVE, UNSPECIFIED LATERALITY, UNSPECIFIED SITE OF BREAST: ICD-10-CM

## 2018-01-08 DIAGNOSIS — T14.8XXA BONE FRACTURE: ICD-10-CM

## 2018-01-08 DIAGNOSIS — C50.812 MALIGNANT NEOPLASM OF OVERLAPPING SITES OF LEFT BREAST IN FEMALE, ESTROGEN RECEPTOR POSITIVE: Primary | ICD-10-CM

## 2018-01-08 DIAGNOSIS — M85.80 OSTEOPENIA, UNSPECIFIED LOCATION: ICD-10-CM

## 2018-01-08 DIAGNOSIS — C50.919 MALIGNANT NEOPLASM OF BREAST IN FEMALE, ESTROGEN RECEPTOR POSITIVE, UNSPECIFIED LATERALITY, UNSPECIFIED SITE OF BREAST: ICD-10-CM

## 2018-01-08 DIAGNOSIS — I10 ESSENTIAL HYPERTENSION: ICD-10-CM

## 2018-01-08 DIAGNOSIS — R11.0 NAUSEA: ICD-10-CM

## 2018-01-08 DIAGNOSIS — M85.88 OSTEOPENIA OF SPINE: Primary | ICD-10-CM

## 2018-01-08 DIAGNOSIS — Z79.811 LONG TERM CURRENT USE OF AROMATASE INHIBITOR: ICD-10-CM

## 2018-01-08 LAB
ALBUMIN SERPL BCP-MCNC: 3.2 G/DL
ALP SERPL-CCNC: 47 U/L
ALT SERPL W/O P-5'-P-CCNC: 18 U/L
ANION GAP SERPL CALC-SCNC: 20 MMOL/L
AST SERPL-CCNC: 21 U/L
BILIRUB SERPL-MCNC: 0.4 MG/DL
BUN SERPL-MCNC: 21 MG/DL
CALCIUM SERPL-MCNC: 9.4 MG/DL
CHLORIDE SERPL-SCNC: 110 MMOL/L
CO2 SERPL-SCNC: 13 MMOL/L
CREAT SERPL-MCNC: 1.2 MG/DL
ERYTHROCYTE [DISTWIDTH] IN BLOOD BY AUTOMATED COUNT: 13.2 %
EST. GFR  (AFRICAN AMERICAN): 56 ML/MIN/1.73 M^2
EST. GFR  (NON AFRICAN AMERICAN): 49 ML/MIN/1.73 M^2
GLUCOSE SERPL-MCNC: 73 MG/DL
HCT VFR BLD AUTO: 42 %
HGB BLD-MCNC: 13.9 G/DL
MAGNESIUM SERPL-MCNC: 2 MG/DL
MCH RBC QN AUTO: 32.9 PG
MCHC RBC AUTO-ENTMCNC: 33.1 G/DL
MCV RBC AUTO: 100 FL
NEUTROPHILS # BLD AUTO: 3.3 K/UL
PHOSPHATE SERPL-MCNC: 3.7 MG/DL
PLATELET # BLD AUTO: 258 K/UL
PMV BLD AUTO: 8.6 FL
POTASSIUM SERPL-SCNC: 3.7 MMOL/L
PROT SERPL-MCNC: 7.5 G/DL
RBC # BLD AUTO: 4.22 M/UL
SODIUM SERPL-SCNC: 143 MMOL/L
WBC # BLD AUTO: 4.87 K/UL

## 2018-01-08 PROCEDURE — A4216 STERILE WATER/SALINE, 10 ML: HCPCS | Performed by: INTERNAL MEDICINE

## 2018-01-08 PROCEDURE — 84100 ASSAY OF PHOSPHORUS: CPT

## 2018-01-08 PROCEDURE — 36415 COLL VENOUS BLD VENIPUNCTURE: CPT

## 2018-01-08 PROCEDURE — 96365 THER/PROPH/DIAG IV INF INIT: CPT

## 2018-01-08 PROCEDURE — 85027 COMPLETE CBC AUTOMATED: CPT

## 2018-01-08 PROCEDURE — 83735 ASSAY OF MAGNESIUM: CPT

## 2018-01-08 PROCEDURE — 63600175 PHARM REV CODE 636 W HCPCS: Performed by: INTERNAL MEDICINE

## 2018-01-08 PROCEDURE — 99214 OFFICE O/P EST MOD 30 MIN: CPT | Mod: S$GLB,,, | Performed by: INTERNAL MEDICINE

## 2018-01-08 PROCEDURE — 80053 COMPREHEN METABOLIC PANEL: CPT

## 2018-01-08 PROCEDURE — 25000003 PHARM REV CODE 250: Performed by: INTERNAL MEDICINE

## 2018-01-08 PROCEDURE — 99999 PR PBB SHADOW E&M-EST. PATIENT-LVL III: CPT | Mod: PBBFAC,,, | Performed by: INTERNAL MEDICINE

## 2018-01-08 RX ORDER — SODIUM CHLORIDE 0.9 % (FLUSH) 0.9 %
10 SYRINGE (ML) INJECTION
Status: CANCELLED | OUTPATIENT
Start: 2018-01-08

## 2018-01-08 RX ORDER — ONDANSETRON 8 MG/1
8 TABLET, ORALLY DISINTEGRATING ORAL EVERY 6 HOURS PRN
Qty: 30 TABLET | Refills: 1 | Status: SHIPPED | OUTPATIENT
Start: 2018-01-08 | End: 2019-01-08

## 2018-01-08 RX ORDER — HEPARIN 100 UNIT/ML
500 SYRINGE INTRAVENOUS
Status: CANCELLED | OUTPATIENT
Start: 2018-01-08

## 2018-01-08 RX ORDER — SODIUM CHLORIDE 0.9 % (FLUSH) 0.9 %
10 SYRINGE (ML) INJECTION
Status: DISCONTINUED | OUTPATIENT
Start: 2018-01-08 | End: 2018-01-08 | Stop reason: HOSPADM

## 2018-01-08 RX ADMIN — ZOLEDRONIC ACID 4 MG: 4 INJECTION, SOLUTION, CONCENTRATE INTRAVENOUS at 11:01

## 2018-01-08 RX ADMIN — SODIUM CHLORIDE, PRESERVATIVE FREE 10 ML: 5 INJECTION INTRAVENOUS at 11:01

## 2018-01-08 NOTE — PLAN OF CARE
Problem: Patient Care Overview (Adult)  Goal: Plan of Care Review  Outcome: Ongoing (interventions implemented as appropriate)  Pt arrived on unit, ambulatory and unassisted, VS stable, Pt plan of care reviewed with Pt and written material on Zometa given to Pt, this will be her 1st dose, she will receive a total of 6 doses, 1 every 6 months, all her questions and concerns were addressed, possible side effects reviewed and Pt verbalized understanding.

## 2018-01-08 NOTE — Clinical Note
Please also schedule Ms. Walker for b/l diagnostic mammogram before she returns to see me in 6 months.

## 2018-01-08 NOTE — NURSING
At  12:00 Zometa infusion complete, no signs or symptoms of transfusion reaction noted, Pt tolerated well, VS stable  Peripheral IV removed  Pt discharged home ambulatory with  @ 13:10

## 2018-01-08 NOTE — PROGRESS NOTES
PROGRESS NOTE    Subjective:      Patient ID: Faby Walker is a 62 y.o. female.     Chief Complaint: follow up for breast cancer     Diagnosis:  Stage IA ([id number]) multifocal invasive ductal carcinoma of the left breast diagnosed on 2014, ER/LA+, HER2 neg.      Treatment history:   1. Core needle biopsy of left breast lesion on 14 showed invasive ductal carcinoma, grade 2, ER positive (78.9%), LA positive (79.7%), HER2 negative (0 by IHC). Ki-67 22.4%.    2. Ms. Walker underwent left lumpectomy + SLND on 2014. Pathology showed invasive ductal carcinoma, multifocal, largest 0.55 cm, grade 2, a second one 0.2 cm, a third one 0.1 cm, negative margin. DCIS (+margin). Four sentinel lymph nodes were removed and negative for malignancy.   3. Radiation of 61.2Gy to left breast 14-14.  4. Endocrine therapy with tamoxifen, letrozole and anastrozole, stopped due to severe myalgias  5. Most recently mammogram on 2017 showed bilateral breast implants, negative for malignancy.   6. Started on exemestane after transferring her care to me in 2017.   7. DEXA scan on 8/15/2017 showed T score of lumbar spine -1.4, consistent with osteopenia and increased risk of fracture.      History of Present Illness:   Ms. Walker is a very pleasant 61 yo  woman with HTN, GERD, asthma, history of early stage left breast cancer, who presents today for follow up. She has been taking exemestane since last September and has tolerated it very well. She had been having a cough for the past 2 months. She went to ER in December. CXR was taken and she was told that she had bronchitis. She has been taking antibiotics which has given her a lot of nausea and GI upset. Denies other complaints. She ran out of exemestane 2 weeks ago.     ECO     ROS:   A ten-point system review is obtained and negative except for what was stated in the HPI.       Physical Examination:   Vital signs reviewed.   Gen: well hydrated, well developed, in no acute distress.  HEENT: normocephalic, anicteric sclerae, PERRLA, EOMI, oropharynx clear  Neck: supple, no JVD, thyromegaly, cervical or supraclavicular LAD  Lungs: CTAB, no wheezes or rales  Heart: RRR, no M/R/G  Breasts: bilateral breasts no abnormal skin changes, nodules, masses, or axillary lympadenopathy  Abdomen: soft, no tenderness, non-distended, no hepatosplenomegaly, mass, or hernia. BS present  Ext: no clubbing, cyanosis, or edema  Neuro: alert and oriented x 4, no focal neuro deficit  Skin: no rash, erythema, open wound or ulcers  Psych: pleasant and appropriate mood and affect          Objective:     Diagnostic Tests:  Most recently mammogram on 5/23/2017 showed bilateral breast implants, negative for malignancy.     DEXA scan on 8/15/2017: T score of lumbar spine -1.4, consistent with osteopenia and increased risk of fracture.     Laboratory Data:  CBC, CMP, phos, magnesium today unremarkable.     Assessment/Plan:     1. Malignant neoplasm of overlapping sites of left breast in female, estrogen receptor positive    2. Nausea    3. Long term current use of aromatase inhibitor    4. Osteopenia, unspecified location    5. Essential hypertension        - Ms. Walker is a very pleasant 61 yo  woman with HTN, GERD, asthma, history of early stage left breast cancer, who presents today for follow up.  - Continue with exemestane  - She continues to have osteopenia despite being on Boniva for 6-8 months. She had two bone fractures in the past. She will have worsening osteopenia now that she is on exemestane. I will start her on zometa q6m for 6 doses for cancer-treatment-related bone loss. The side effects of zometa were discussed with her, which include but are not limited to infusion reaction, fever, joint swelling, low calcium level, and osteonecrosis of the jaw. She understands and agrees with the plan. She  will get zometa today.   - Continue vit D, aspirin. Encourage daily exercise  - BP good. Continue with current meds.   - She has nausea from antibiotics. Zofran prn for nausea.   - Will get imaging study results from local ER (CXR, CT chest, etc.)  - RTC in 6 months with mammogram.   - All questions were answered in the office. Ms. Walker understands and agrees with the plan.       ADDENDUM:  Received imaging study report from the [facility].  CT chest on 12/18/2017: no evidence of acute pulmonary thromboembolic disease. No adenopathy. No hilar mass lesions. The lungs are essentially clear. Minimal subsegmental atelectasis at the bases. No chest wall mass lesion or axillary adenopathy. Visualized abdominal contents reveal no acute or concerning pathology. Prior bariatric surgery. No acute or aggressive bony lesions detected. Mild severity multilevel spondylosis.       Electronically signed by Aden Rios              Distress Screening Results: Psychosocial Distress screening score of Distress Score: 0 noted and reviewed. No intervention indicated.

## 2018-01-09 ENCOUNTER — TELEPHONE (OUTPATIENT)
Dept: HEMATOLOGY/ONCOLOGY | Facility: CLINIC | Age: 63
End: 2018-01-09

## 2018-01-09 NOTE — TELEPHONE ENCOUNTER
Spoke with Ms. Walker regarding her Mammogram appt, pt asked if Mammogram appt can be rescheduled to August. I informed pt that I will call Radiology and ask to change her appt to August. Pt verbalized understanding.

## 2018-01-09 NOTE — TELEPHONE ENCOUNTER
Called pt to inform her that her Mammogram appt was changed to Monday August 8, 2018 at 8 am. Unable to reach pt, left a message with pt's . Informed Mr. Walker that the pt's Mammogram appt was changed to Monday August 8, 2018 at 8 am. I also informed Mr. Walker that I will mail out Ms. Walker's appt to their house. Mr. Walker verbalized understanding and stated he will let his wife know.

## 2018-01-26 ENCOUNTER — HISTORICAL (OUTPATIENT)
Dept: ADMINISTRATIVE | Facility: HOSPITAL | Age: 63
End: 2018-01-26

## 2018-01-29 LAB — FINAL CULTURE: NORMAL

## 2018-02-14 ENCOUNTER — TELEPHONE (OUTPATIENT)
Dept: HEMATOLOGY/ONCOLOGY | Facility: CLINIC | Age: 63
End: 2018-02-14

## 2018-02-14 NOTE — TELEPHONE ENCOUNTER
Spoke with Idalia Justin at Dr. Bolden office. MsScott Justin stated Mrs. Walker has decay to nerve of the tooth, which is non restorable and needs to be removed. Dr. Bolden was wondering if it is okay to perform a tooth extraction on this patient? Dr. Bolden can be reached at [phone]. I informed Ms. Justin that Dr. Rios is currently on Maternity leave and I will let one of her colleagues know. Ms. Justin verbalized understanding.

## 2018-02-14 NOTE — TELEPHONE ENCOUNTER
----- Message from Bonnie Shivam sent at 2/14/2018  2:59 PM CST -----  Contact: Idalia  X  1st Request  _  2nd Request  _  3rd Request    Who:Idalia with [facility]    Why: Idalia with [facility] states she would like to speak with the staff in regards to scheduling an extraction for the patient and discussing her medications... Please contact to further discuss and advise     What Number to Call Back: [phone]    When to Expect a call back: (Before the end of the day)   -- if call after 3:00 call back will be tomorrow.

## 2018-02-15 NOTE — TELEPHONE ENCOUNTER
Spoke with Dr Yuan Friedman and informed her that the Zometa infusion from 1/8/18 might slightly increase the risk of healing complications, but that I felt is was safe to proceed with the extraction.

## 2018-02-20 ENCOUNTER — HISTORICAL (OUTPATIENT)
Dept: ADMINISTRATIVE | Facility: HOSPITAL | Age: 63
End: 2018-02-20

## 2018-02-20 LAB
FLUAV AG NPH QL IA: NEGATIVE
FLUBV AG NPH QL IA: POSITIVE

## 2018-02-22 LAB
FINAL CULTURE: NORMAL
RAPID GROUP A STREP (OHS): NEGATIVE

## 2018-05-14 ENCOUNTER — OFFICE VISIT (OUTPATIENT)
Dept: HEMATOLOGY/ONCOLOGY | Facility: CLINIC | Age: 63
End: 2018-05-14
Payer: COMMERCIAL

## 2018-05-14 ENCOUNTER — HOSPITAL ENCOUNTER (OUTPATIENT)
Dept: RADIOLOGY | Facility: OTHER | Age: 63
Discharge: HOME | End: 2018-05-14
Attending: INTERNAL MEDICINE
Payer: COMMERCIAL

## 2018-05-14 VITALS
WEIGHT: 244.94 LBS | HEART RATE: 67 BPM | SYSTOLIC BLOOD PRESSURE: 102 MMHG | RESPIRATION RATE: 18 BRPM | DIASTOLIC BLOOD PRESSURE: 59 MMHG | OXYGEN SATURATION: 96 % | BODY MASS INDEX: 41.82 KG/M2 | HEIGHT: 64 IN | TEMPERATURE: 96 F

## 2018-05-14 DIAGNOSIS — Z17.0 MALIGNANT NEOPLASM OF OVERLAPPING SITES OF LEFT BREAST IN FEMALE, ESTROGEN RECEPTOR POSITIVE: Primary | ICD-10-CM

## 2018-05-14 DIAGNOSIS — N64.4 BREAST PAIN: ICD-10-CM

## 2018-05-14 DIAGNOSIS — Z79.811 LONG TERM (CURRENT) USE OF AROMATASE INHIBITORS: ICD-10-CM

## 2018-05-14 DIAGNOSIS — I10 ESSENTIAL HYPERTENSION: ICD-10-CM

## 2018-05-14 DIAGNOSIS — C50.812 MALIGNANT NEOPLASM OF OVERLAPPING SITES OF LEFT BREAST IN FEMALE, ESTROGEN RECEPTOR POSITIVE: Primary | ICD-10-CM

## 2018-05-14 DIAGNOSIS — M85.88 OSTEOPENIA OF SPINE: ICD-10-CM

## 2018-05-14 DIAGNOSIS — N64.4 BREAST PAIN, LEFT: ICD-10-CM

## 2018-05-14 DIAGNOSIS — C50.812 MALIGNANT NEOPLASM OF OVERLAPPING SITES OF LEFT BREAST IN FEMALE, ESTROGEN RECEPTOR POSITIVE: ICD-10-CM

## 2018-05-14 DIAGNOSIS — Z17.0 MALIGNANT NEOPLASM OF OVERLAPPING SITES OF LEFT BREAST IN FEMALE, ESTROGEN RECEPTOR POSITIVE: ICD-10-CM

## 2018-05-14 PROCEDURE — 77066 DX MAMMO INCL CAD BI: CPT | Mod: TC

## 2018-05-14 PROCEDURE — 77066 DX MAMMO INCL CAD BI: CPT | Mod: 26,,, | Performed by: RADIOLOGY

## 2018-05-14 PROCEDURE — 3078F DIAST BP <80 MM HG: CPT | Mod: CPTII,S$GLB,, | Performed by: INTERNAL MEDICINE

## 2018-05-14 PROCEDURE — 76642 ULTRASOUND BREAST LIMITED: CPT | Mod: 26,LT,, | Performed by: RADIOLOGY

## 2018-05-14 PROCEDURE — 3008F BODY MASS INDEX DOCD: CPT | Mod: CPTII,S$GLB,, | Performed by: INTERNAL MEDICINE

## 2018-05-14 PROCEDURE — 76642 ULTRASOUND BREAST LIMITED: CPT | Mod: TC,LT

## 2018-05-14 PROCEDURE — 99214 OFFICE O/P EST MOD 30 MIN: CPT | Mod: S$GLB,,, | Performed by: INTERNAL MEDICINE

## 2018-05-14 PROCEDURE — 77062 BREAST TOMOSYNTHESIS BI: CPT | Mod: 26,,, | Performed by: RADIOLOGY

## 2018-05-14 PROCEDURE — 99999 PR PBB SHADOW E&M-EST. PATIENT-LVL III: CPT | Mod: PBBFAC,,, | Performed by: INTERNAL MEDICINE

## 2018-05-14 PROCEDURE — 3074F SYST BP LT 130 MM HG: CPT | Mod: CPTII,S$GLB,, | Performed by: INTERNAL MEDICINE

## 2018-05-14 RX ORDER — VALACYCLOVIR HYDROCHLORIDE 1 G/1
1000 TABLET, FILM COATED ORAL
COMMUNITY
End: 2018-08-29 | Stop reason: SDUPTHER

## 2018-05-14 RX ORDER — CLONIDINE HYDROCHLORIDE 0.1 MG/1
0.1 TABLET ORAL EVERY 6 HOURS PRN
COMMUNITY
Start: 2018-03-27

## 2018-05-14 RX ORDER — ISOSORBIDE MONONITRATE 60 MG/1
TABLET, EXTENDED RELEASE ORAL
COMMUNITY
Start: 2018-04-26

## 2018-05-14 RX ORDER — ROTIGOTINE 4 MG/24H
1 PATCH, EXTENDED RELEASE TRANSDERMAL DAILY
COMMUNITY
Start: 2018-03-05

## 2018-05-14 RX ORDER — POTASSIUM CHLORIDE 750 MG/1
10 CAPSULE, EXTENDED RELEASE ORAL
COMMUNITY
End: 2018-09-12 | Stop reason: SDUPTHER

## 2018-05-14 RX ORDER — POTASSIUM CHLORIDE 1125 MG/1
15 TABLET, EXTENDED RELEASE ORAL 2 TIMES DAILY
COMMUNITY
Start: 2018-05-02

## 2018-05-14 RX ORDER — FUROSEMIDE 20 MG/1
20 TABLET ORAL DAILY
COMMUNITY

## 2018-05-14 RX ORDER — NITROGLYCERIN 0.4 MG/1
0.4 TABLET SUBLINGUAL
COMMUNITY
Start: 2017-06-28

## 2018-05-14 RX ORDER — TOPIRAMATE 25 MG/1
25 TABLET ORAL
COMMUNITY
End: 2018-08-29 | Stop reason: SDUPTHER

## 2018-05-14 RX ORDER — WITCH HAZEL 50 %
2000 PADS, MEDICATED (EA) TOPICAL
COMMUNITY
End: 2018-08-29 | Stop reason: SDUPTHER

## 2018-05-14 RX ORDER — LEVOTHYROXINE SODIUM 150 UG/1
150 TABLET ORAL
COMMUNITY

## 2018-05-14 RX ORDER — NEBIVOLOL 5 MG/1
5 TABLET ORAL DAILY
COMMUNITY

## 2018-05-14 RX ORDER — RANOLAZINE 1000 MG/1
1000 TABLET, FILM COATED, EXTENDED RELEASE ORAL 2 TIMES DAILY
COMMUNITY
Start: 2018-04-11

## 2018-05-14 RX ORDER — METRONIDAZOLE 10 MG/G
GEL TOPICAL
COMMUNITY
End: 2018-08-29 | Stop reason: SDUPTHER

## 2018-05-14 RX ORDER — RANOLAZINE 500 MG/1
1000 TABLET, EXTENDED RELEASE ORAL
COMMUNITY
End: 2018-08-29 | Stop reason: SDUPTHER

## 2018-05-14 RX ORDER — ESOMEPRAZOLE MAGNESIUM 40 MG/1
CAPSULE, DELAYED RELEASE ORAL
COMMUNITY
Start: 2018-05-08

## 2018-05-14 RX ORDER — ACETAMINOPHEN 500 MG
500 TABLET ORAL
COMMUNITY
End: 2018-08-29 | Stop reason: SDUPTHER

## 2018-05-14 RX ORDER — CHOLECALCIFEROL (VITAMIN D3) 25 MCG
2500 TABLET ORAL DAILY
COMMUNITY

## 2018-05-14 RX ORDER — MONTELUKAST SODIUM 10 MG/1
10 TABLET ORAL
COMMUNITY

## 2018-05-14 RX ORDER — EZETIMIBE 10 MG/1
TABLET ORAL
COMMUNITY
Start: 2018-03-27

## 2018-05-14 RX ORDER — FERROUS SULFATE 325(65) MG
100 TABLET, DELAYED RELEASE (ENTERIC COATED) ORAL
COMMUNITY

## 2018-05-14 RX ORDER — LORAZEPAM 0.5 MG/1
1 TABLET ORAL NIGHTLY
COMMUNITY

## 2018-05-14 RX ORDER — TOPIRAMATE 50 MG/1
TABLET, FILM COATED ORAL
COMMUNITY
Start: 2018-04-20

## 2018-05-14 RX ORDER — CLOPIDOGREL BISULFATE 75 MG/1
75 TABLET ORAL
COMMUNITY

## 2018-05-14 RX ORDER — SPIRONOLACTONE 25 MG/1
25 TABLET ORAL
COMMUNITY

## 2018-05-14 RX ORDER — DULOXETIN HYDROCHLORIDE 60 MG/1
60 CAPSULE, DELAYED RELEASE ORAL
COMMUNITY

## 2018-05-14 NOTE — PROGRESS NOTES
PROGRESS NOTE     Subjective:      Patient ID: Faby Walker is a 62 y.o. female.     Chief Complaint: follow up for breast cancer     Diagnosis:  Stage IA ([id number]) multifocal invasive ductal carcinoma of the left breast diagnosed on 2014, ER/CT+, HER2 neg.      Treatment history:   1. Core needle biopsy of left breast lesion on 14 showed invasive ductal carcinoma, grade 2, ER positive (78.9%), CT positive (79.7%), HER2 negative (0 by IHC). Ki-67 22.4%.    2. Ms. Walker underwent left lumpectomy + SLND on 2014. Pathology showed invasive ductal carcinoma, multifocal, largest 0.55 cm, grade 2, a second one 0.2 cm, a third one 0.1 cm, negative margin. DCIS (+margin). Four sentinel lymph nodes were removed and negative for malignancy.   3. Radiation of 61.2Gy to left breast 14-14.  4. Endocrine therapy with tamoxifen, letrozole and anastrozole, stopped due to severe myalgias  5. Most recently mammogram on 2017 showed bilateral breast implants, negative for malignancy.   6. Started on exemestane after transferring her care to me in 2017.   7. DEXA scan on 8/15/2017 showed T score of lumbar spine -1.4, consistent with osteopenia and increased risk of fracture.      History of Present Illness:   Ms. Walker is a very pleasant 63 yo  woman with HTN, GERD, asthma, history of early stage left breast cancer, who presents today for follow up. She has been taking exemestane since 2017 and has tolerated it very well. She did notice pain and itching in her left breast over the past few days. Mammogram today was normal. She received zometa on 18. Had a tooth that got decayed and pulled in 2018. Denies other complaints.      ECO     ROS:   A ten-point system review is obtained and negative except for what was stated in the HPI.      Physical Examination:   Vital signs reviewed.   Gen: well hydrated, well developed, in no acute distress.  HEENT: normocephalic,  anicteric sclerae, PERRLA, EOMI, oropharynx clear  Neck: supple, no JVD, thyromegaly, cervical or supraclavicular LAD  Lungs: CTAB, no wheezes or rales  Heart: RRR, no M/R/G  Breasts: bilateral breasts no abnormal skin changes, nodules, masses, or axillary lympadenopathy  Abdomen: soft, no tenderness, non-distended, no hepatosplenomegaly, mass, or hernia. BS present  Ext: no clubbing, cyanosis, or edema  Neuro: alert and oriented x 4, no focal neuro deficit  Skin: no rash, erythema, open wound or ulcers  Psych: pleasant and appropriate mood and affect        Objective:      Diagnostic Tests:  Mammogram today is normal. Recc repeat mammogram in 1 year.      DEXA scan on 8/15/2017: T score of lumbar spine -1.4, consistent with osteopenia and increased risk of fracture.           Assessment/Plan:      1. Malignant neoplasm of overlapping sites of left breast in female, estrogen receptor positive    2. Osteopenia of spine    3. Long term (current) use of aromatase inhibitors    4. Essential hypertension      1.3.  - Doing well. Mammogram today normal  - repeat mammogram in May 2019  - c/w exemestane    2.   - had dental work done in Apr.   - She lives [place]. It is hard for her to get off work. Will get #2 zometa when she comes back in 6 months    4.  - BP good. C/w current meds.          Distress Screening Results: Psychosocial Distress screening score of Distress Score: 1 noted and reviewed. No intervention indicated.

## 2018-05-14 NOTE — PROGRESS NOTES
Mrs. Walker today who is accompanying her . She has been having itchy pain in her left breast and is concerned about recurrence. She had breast pain initially which led to the diagnosis of her breast cancer. Will move her mammogram to this week.

## 2018-06-13 DIAGNOSIS — F32.A DEPRESSION, UNSPECIFIED DEPRESSION TYPE: ICD-10-CM

## 2018-06-13 DIAGNOSIS — F41.9 ANXIETY: ICD-10-CM

## 2018-06-15 ENCOUNTER — TELEPHONE (OUTPATIENT)
Dept: INFUSION THERAPY | Facility: HOSPITAL | Age: 63
End: 2018-06-15

## 2018-06-19 ENCOUNTER — TELEPHONE (OUTPATIENT)
Dept: INFUSION THERAPY | Facility: HOSPITAL | Age: 63
End: 2018-06-19
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
## 2018-06-20 ENCOUNTER — OFFICE VISIT (OUTPATIENT)
Dept: PSYCHIATRY | Facility: CLINIC | Age: 63
End: 2018-06-20
Payer: COMMERCIAL

## 2018-06-20 DIAGNOSIS — F51.04 PSYCHOPHYSIOLOGICAL INSOMNIA: ICD-10-CM

## 2018-06-20 DIAGNOSIS — F33.1 MODERATE EPISODE OF RECURRENT MAJOR DEPRESSIVE DISORDER: Primary | ICD-10-CM

## 2018-06-20 DIAGNOSIS — F41.1 GENERALIZED ANXIETY DISORDER: ICD-10-CM

## 2018-06-20 PROCEDURE — 90791 PSYCH DIAGNOSTIC EVALUATION: CPT | Mod: S$GLB,,, | Performed by: PSYCHOLOGIST

## 2018-06-20 PROCEDURE — 99999 PR PBB SHADOW E&M-EST. PATIENT-LVL II: CPT | Mod: PBBFAC,,, | Performed by: PSYCHOLOGIST

## 2018-06-20 NOTE — LETTER
June 20, 2018        Aden Rios MD  [address]  [address]  [facility] [address]             [facility]  [address] [facility] [address]  Phone: [phone]  Fax: [phone]   Patient: Faby Walker   MR Number: [id number]   YOB: 1955   Date of Visit: 6/20/2018       Dear Dr. Rios:    Thank you for referring Faby Walker to me for evaluation. Below are the relevant portions of my assessment and plan of care.     Faby Walker is a 63 y.o. female referred by Dr. Rios for psychological evaluation and treatment.  Mrs. Walker is a breast cancer patient (currently on exemestane). She is also the caregiver for her  who is struggling with stage 4 breast cancer and the emotional consequences of this diagnosis.  Mrs. Walker is experiencing significant anxiety and depression (slightly improved this week with penitentiary). Her psychotropic medications are currently managed by a psychiatric NP in [facility] (Beronica Chamorro).   Mrs. Walker is interested in CBT to address depression/anxiety/insomnia and will follow up with me for that purpose (q 3 weeks when  is here for treatment).     If you have questions, please do not hesitate to call me. I look forward to following Faby along with you.    Sincerely,      Han Graf, PhD           CC  No Recipients

## 2018-07-10 ENCOUNTER — TELEPHONE (OUTPATIENT)
Dept: INFUSION THERAPY | Facility: HOSPITAL | Age: 63
End: 2018-07-10

## 2018-08-02 ENCOUNTER — TELEPHONE (OUTPATIENT)
Dept: INFUSION THERAPY | Facility: HOSPITAL | Age: 63
End: 2018-08-02

## 2018-08-02 ENCOUNTER — TELEPHONE (OUTPATIENT)
Dept: PSYCHIATRY | Facility: CLINIC | Age: 63
End: 2018-08-02

## 2018-08-02 NOTE — TELEPHONE ENCOUNTER
Spoke to patient by phone due to no-show.  They are still at ED at [facility]. Patient wishes to reschedule. MANDY Graf, PhD

## 2018-08-03 ENCOUNTER — TELEPHONE (OUTPATIENT)
Dept: INFUSION THERAPY | Facility: HOSPITAL | Age: 63
End: 2018-08-03

## 2018-08-09 ENCOUNTER — OFFICE VISIT (OUTPATIENT)
Dept: PSYCHIATRY | Facility: CLINIC | Age: 63
End: 2018-08-09
Payer: COMMERCIAL

## 2018-08-09 DIAGNOSIS — F33.1 MODERATE EPISODE OF RECURRENT MAJOR DEPRESSIVE DISORDER: ICD-10-CM

## 2018-08-09 DIAGNOSIS — F41.1 GENERALIZED ANXIETY DISORDER: Primary | ICD-10-CM

## 2018-08-09 PROCEDURE — 90834 PSYTX W PT 45 MINUTES: CPT | Mod: S$GLB,,, | Performed by: PSYCHOLOGIST

## 2018-08-09 PROCEDURE — 99999 PR PBB SHADOW E&M-EST. PATIENT-LVL II: CPT | Mod: PBBFAC,,, | Performed by: PSYCHOLOGIST

## 2018-08-10 NOTE — PROGRESS NOTES
PSYCHO-ONCOLOGY NOTE/ Individual Psychotherapy     Date: 8/9/2018   Site:  [address]        Therapeutic Intervention: Met with patient. Outpatient - Behavior modifying psychotherapy 45 min - CPT code 99343    The patient's last visit with me was on 6/20/2018.    Problem list  Patient Active Problem List   Diagnosis    Osteopenia of spine    Bone fracture    Malignant neoplasm of overlapping sites of left breast in female, estrogen receptor positive    Moderate episode of recurrent major depressive disorder    Anxiety    Generalized anxiety disorder    Psychophysiological insomnia       Chief complaint/reason for encounter: insomnia, depression and anxiety   Met with patient to evaluate psychosocial adaptation to diagnosis/treatment/survivorship of breast cancer and caregiving of a cancer patient    Current Medications  Current Outpatient Prescriptions   Medication    acetaminophen (TYLENOL) 500 MG tablet    acetaminophen (TYLENOL) 500 MG tablet    amoxicillin (AMOXIL) 875 MG tablet    b complex vitamins tablet    cloNIDine (CATAPRES) 0.1 MG tablet    clopidogrel (PLAVIX) 75 mg tablet    clopidogrel (PLAVIX) 75 mg tablet    cyanocobalamin 2000 MCG tablet    cyanocobalamin 2000 MCG tablet    duloxetine (CYMBALTA) 60 MG capsule    DULoxetine (CYMBALTA) 60 MG capsule    eluxadoline (VIBERZI) 75 mg Tab    esomeprazole (NEXIUM) 40 MG capsule    exemestane (AROMASIN) 25 mg tablet    ezetimibe (ZETIA) 10 mg tablet    ferrous sulfate 325 (65 FE) MG EC tablet    ferrous sulfate 325 (65 FE) MG EC tablet    furosemide (LASIX) 20 MG tablet    furosemide (LASIX) 20 MG tablet    isosorbide mononitrate (IMDUR) 60 MG 24 hr tablet    KLOR-CON M15 15 mEq tablet    levothyroxine (SYNTHROID) 150 MCG tablet    levothyroxine (SYNTHROID) 150 MCG tablet    lorazepam (ATIVAN) 0.5 MG tablet    LORazepam (ATIVAN) 0.5 MG tablet    metronidazole 1% (METROGEL) 1 % Gel    metronidazole 1% (METROGEL) 1 % Gel     montelukast (SINGULAIR) 10 mg tablet    montelukast (SINGULAIR) 10 mg tablet    nebivolol (BYSTOLIC) 5 MG Tab    nebivolol (BYSTOLIC) 5 MG Tab    NEUPRO 4 mg/24 hour    nitroGLYCERIN (NITROSTAT) 0.4 MG SL tablet    nitroGLYCERIN (NITROSTAT) 0.4 MG SL tablet    ondansetron (ZOFRAN-ODT) 8 MG TbDL    pantoprazole (PROTONIX) 40 MG tablet    potassium chloride (MICRO-K) 10 MEQ CpSR    potassium chloride (MICRO-K) 10 MEQ CpSR    RANEXA 1,000 mg Tb12    ranolazine (RANEXA) 500 MG Tb12    ranolazine (RANEXA) 500 MG Tb12    spironolactone (ALDACTONE) 25 MG tablet    spironolactone (ALDACTONE) 25 MG tablet    topiramate (TOPAMAX) 25 MG tablet    topiramate (TOPAMAX) 25 MG tablet    topiramate (TOPAMAX) 50 MG tablet    valacyclovir (VALTREX) 1000 MG tablet    valACYclovir (VALTREX) 1000 MG tablet    VITAMIN B COMPLEX ORAL    vitamin D 1000 units Tab     No current facility-administered medications for this visit.        Objective:  Faby Walker arrived promptly for the session.  Ms. Walker was independently ambulatory at the time of session. The patient was fully cooperative throughout the session.  Appearance: age appropriate,  casually dressed, adequately groomed  Behavior/Cooperation: friendly and cooperative  Speech: Not pressured, clearly audible, no slurring  Mood: anxious and depressed  Affect: mildly constricted  Thought Process: goal-directed, logical  Thought Content: normal,  No delusions or paranoia; did not appear to be responding to internal stimuli during the session  Orientation: grossly intact  Memory: Grossly intact  Attention Span/Concentration: Attends to session without distraction; reports no difficulty  Fund of Knowledge: average  Estimate of Intelligence: average from verbal skills and history  Cognition: grossly intact  Insight: patient has awareness of illness; good insight into own behavior and behavior of others  Judgment: the patient's behavior is adequate to  circumstances    Interval history and content of current session: Patient discussed adaptation to senior living and recent struggles to maintain composure during caregiving tasks.  Discussed current adaptation to disease and treatment status and adaptation to 's illness. Reports to be coping with moderate difficulty. Evaluated cognitive response, paying particular attention to negative intrusive thoughts of a persistent and detrimental nature. Thoughts of this type are in evidence with moderate distress. Provided cognitive behavioral therapy to address negative cognitions. Identified and evaluated psychosocial and environmental stressors secondary to diagnosis and treatment.  Examined proactive behaviors that may be implemented to minimize or ameliorate psychosocial stressors secondary to diagnosis and treatment.    Discussed importance of planned pleasant events, day trips, nods to prior hobbies (e.g, cannot camp, but can spend the night at a hotel in a nice area, etc.)    Patient notes increased insomnia.  2-3 nights week with minimal rest.  Increasing irritability/coping strain.       Risk parameters:   Patient reports no suicidal ideation  Patient reports no homicidal ideation  Patient reports no self-injurious behavior  Patient reports no violent behavior   Safety needs:  None at this time      Verbal deficits: None     Patient's response to intervention:The patient's response to intervention is accepting.     Progress toward goals and other mental status changes:  The patient's progress toward goals is good.      Progress to date:Progress as Expected      Goals from last visit: Attempted, partially met      Patient reported outcomes:  Distress Thermometer:   Distress Score    Distress Score: 3        Practical Problems Physical Problems                                                   Family Problems                                         Emotional Problems                                                          Spiritual/Religions Concerns               Other Problems             PHQ-9= 5 (16 at intake)   JOSE ENRIQUE-7= 1 (13 at intake)      Client Strengths: verbal, intelligent, successful, good social support, commitment to wellness    Diagnosis:     ICD-10-CM ICD-9-CM   1. Generalized anxiety disorder F41.1 300.02   2. Moderate episode of recurrent major depressive disorder F33.1 296.32       Treatment Plan:individual psychotherapy and medication management by physician  · Target symptoms: depression, anxiety , sleep  · Why chosen therapy is appropriate versus another modality: relevant to diagnosis, evidence based practice  · Outcome monitoring methods: self-report, checklist/rating scale  · Therapeutic intervention type: behavior modifying psychotherapy  · Prognosis: Good      Behavioral goals:    Exercise:  Consider yoga/ela chi   Stress management:  Meditation classes, BeWell relaxation exercises   Social engagement:   Nutrition:   Smoking Cessation:   Therapy:  See NP about sleep difficulties     FG book    Roberto Eli book    Return to clinic: 1 week     Length of Service (minutes direct face-to-face contact): 45    Han Graf, PhD  [place] License #[id number]

## 2018-08-15 ENCOUNTER — OFFICE VISIT (OUTPATIENT)
Dept: PSYCHIATRY | Facility: CLINIC | Age: 63
End: 2018-08-15
Payer: COMMERCIAL

## 2018-08-15 DIAGNOSIS — F41.1 GENERALIZED ANXIETY DISORDER: Primary | ICD-10-CM

## 2018-08-15 DIAGNOSIS — F33.1 MODERATE EPISODE OF RECURRENT MAJOR DEPRESSIVE DISORDER: ICD-10-CM

## 2018-08-15 PROCEDURE — 90832 PSYTX W PT 30 MINUTES: CPT | Mod: S$GLB,,, | Performed by: PSYCHOLOGIST

## 2018-08-15 PROCEDURE — 99999 PR PBB SHADOW E&M-EST. PATIENT-LVL II: CPT | Mod: PBBFAC,,, | Performed by: PSYCHOLOGIST

## 2018-08-15 NOTE — PROGRESS NOTES
PSYCHO-ONCOLOGY NOTE/ Individual Psychotherapy     Date: 8/15/2018   Site:  [facility]        Therapeutic Intervention: Met with patient. Outpatient - Behavior modifying psychotherapy 30 min - CPT code 77971    The patient's last visit with me was on 8/9/2018.    Problem list  Patient Active Problem List   Diagnosis    Osteopenia of spine    Bone fracture    Malignant neoplasm of overlapping sites of left breast in female, estrogen receptor positive    Moderate episode of recurrent major depressive disorder    Anxiety    Generalized anxiety disorder    Psychophysiological insomnia       Chief complaint/reason for encounter: insomnia, depression and anxiety   Met with patient to evaluate psychosocial adaptation to diagnosis/treatment/survivorship of breast cancer and caregiving of a cancer patient    Current Medications  Current Outpatient Medications   Medication    acetaminophen (TYLENOL) 500 MG tablet    acetaminophen (TYLENOL) 500 MG tablet    amoxicillin (AMOXIL) 875 MG tablet    b complex vitamins tablet    cloNIDine (CATAPRES) 0.1 MG tablet    clopidogrel (PLAVIX) 75 mg tablet    clopidogrel (PLAVIX) 75 mg tablet    cyanocobalamin 2000 MCG tablet    cyanocobalamin 2000 MCG tablet    duloxetine (CYMBALTA) 60 MG capsule    DULoxetine (CYMBALTA) 60 MG capsule    eluxadoline (VIBERZI) 75 mg Tab    esomeprazole (NEXIUM) 40 MG capsule    exemestane (AROMASIN) 25 mg tablet    ezetimibe (ZETIA) 10 mg tablet    ferrous sulfate 325 (65 FE) MG EC tablet    ferrous sulfate 325 (65 FE) MG EC tablet    furosemide (LASIX) 20 MG tablet    furosemide (LASIX) 20 MG tablet    isosorbide mononitrate (IMDUR) 60 MG 24 hr tablet    KLOR-CON M15 15 mEq tablet    levothyroxine (SYNTHROID) 150 MCG tablet    levothyroxine (SYNTHROID) 150 MCG tablet    lorazepam (ATIVAN) 0.5 MG tablet    LORazepam (ATIVAN) 0.5 MG tablet    metronidazole 1% (METROGEL) 1 % Gel    metronidazole 1% (METROGEL) 1 % Gel     montelukast (SINGULAIR) 10 mg tablet    montelukast (SINGULAIR) 10 mg tablet    nebivolol (BYSTOLIC) 5 MG Tab    nebivolol (BYSTOLIC) 5 MG Tab    NEUPRO 4 mg/24 hour    nitroGLYCERIN (NITROSTAT) 0.4 MG SL tablet    nitroGLYCERIN (NITROSTAT) 0.4 MG SL tablet    ondansetron (ZOFRAN-ODT) 8 MG TbDL    pantoprazole (PROTONIX) 40 MG tablet    potassium chloride (MICRO-K) 10 MEQ CpSR    potassium chloride (MICRO-K) 10 MEQ CpSR    RANEXA 1,000 mg Tb12    ranolazine (RANEXA) 500 MG Tb12    ranolazine (RANEXA) 500 MG Tb12    spironolactone (ALDACTONE) 25 MG tablet    spironolactone (ALDACTONE) 25 MG tablet    topiramate (TOPAMAX) 25 MG tablet    topiramate (TOPAMAX) 25 MG tablet    topiramate (TOPAMAX) 50 MG tablet    valacyclovir (VALTREX) 1000 MG tablet    valACYclovir (VALTREX) 1000 MG tablet    VITAMIN B COMPLEX ORAL    vitamin D 1000 units Tab     No current facility-administered medications for this visit.        Objective:  Faby Walker arrived 20 minutes late for the session.  Ms. Walker was independently ambulatory at the time of session. The patient was fully cooperative throughout the session.  Appearance: age appropriate,  casually dressed, adequately groomed  Behavior/Cooperation: friendly and cooperative  Speech: Not pressured, clearly audible, no slurring  Mood: anxious and depressed  Affect: mildly constricted  Thought Process: goal-directed, logical  Thought Content: normal,  No delusions or paranoia; did not appear to be responding to internal stimuli during the session  Orientation: grossly intact  Memory: Grossly intact  Attention Span/Concentration: Attends to session without distraction; reports no difficulty  Fund of Knowledge: average  Estimate of Intelligence: average from verbal skills and history  Cognition: grossly intact  Insight: patient has awareness of illness; good insight into own behavior and behavior of others  Judgment: the patient's behavior is adequate to  circumstances    Interval history and content of current session: Patient discussed adaptation to caregiving. Discussed strains of 's PET yesterday due to his emotional reaction. Discussed current adaptation to disease and treatment status and adaptation to 's illness. Reports to be coping with moderate difficulty. Evaluated cognitive response, paying particular attention to negative intrusive thoughts of a persistent and detrimental nature. Thoughts of this type are in evidence with moderate distress. Provided cognitive behavioral therapy to address negative cognitions. Identified and evaluated psychosocial and environmental stressors secondary to diagnosis and treatment.  Examined proactive behaviors that may be implemented to minimize or ameliorate psychosocial stressors secondary to diagnosis and treatment.  Has joined a women's group and plans to go out to eat with friends this weekend.        Risk parameters:   Patient reports no suicidal ideation  Patient reports no homicidal ideation  Patient reports no self-injurious behavior  Patient reports no violent behavior   Safety needs:  None at this time      Verbal deficits: None     Patient's response to intervention:The patient's response to intervention is accepting.     Progress toward goals and other mental status changes:  The patient's progress toward goals is good.      Progress to date:Progress as Expected      Goals from last visit: not yet met from last visit      Patient reported outcomes:  Distress Thermometer:   Distress Score    Distress Score: 3        Practical Problems Physical Problems                                                   Family Problems                                         Emotional Problems                                                         Spiritual/Religions Concerns               Other Problems             PHQ-9= 6(16 at intake)   JOSE ENRIQUE-7= 3 (13 at intake)      Client Strengths: verbal, intelligent,  successful, good social support, commitment to wellness    Diagnosis:     ICD-10-CM ICD-9-CM   1. Generalized anxiety disorder F41.1 300.02   2. Moderate episode of recurrent major depressive disorder F33.1 296.32       Treatment Plan:individual psychotherapy and medication management by physician  · Target symptoms: depression, anxiety , sleep  · Why chosen therapy is appropriate versus another modality: relevant to diagnosis, evidence based practice  · Outcome monitoring methods: self-report, checklist/rating scale  · Therapeutic intervention type: behavior modifying psychotherapy  · Prognosis: Good      Behavioral goals:    Exercise:  Consider yoga/ela chi   Stress management:  Meditation classes, BeWell relaxation exercises   Social engagement:   Nutrition:   Smoking Cessation:   Therapy:  See NP about sleep difficulties     FG book    Roberto booth    Return to clinic: 1 week     Length of Service (minutes direct face-to-face contact): 45    Han Graf, PhD  [place] License #[id number]

## 2018-08-28 ENCOUNTER — OFFICE VISIT (OUTPATIENT)
Dept: PSYCHIATRY | Facility: CLINIC | Age: 63
End: 2018-08-28
Payer: COMMERCIAL

## 2018-08-28 DIAGNOSIS — F51.04 PSYCHOPHYSIOLOGICAL INSOMNIA: ICD-10-CM

## 2018-08-28 DIAGNOSIS — F33.1 MODERATE EPISODE OF RECURRENT MAJOR DEPRESSIVE DISORDER: ICD-10-CM

## 2018-08-28 DIAGNOSIS — F41.1 GENERALIZED ANXIETY DISORDER: Primary | ICD-10-CM

## 2018-08-28 PROCEDURE — 90832 PSYTX W PT 30 MINUTES: CPT | Mod: S$GLB,,, | Performed by: PSYCHOLOGIST

## 2018-08-28 PROCEDURE — 99999 PR PBB SHADOW E&M-EST. PATIENT-LVL II: CPT | Mod: PBBFAC,,, | Performed by: PSYCHOLOGIST

## 2018-08-29 NOTE — PROGRESS NOTES
PSYCHO-ONCOLOGY NOTE/ Individual Psychotherapy     Date: 8/28/2018   Site:  [facility] [address]        Therapeutic Intervention: Met with patient. Outpatient - Behavior modifying psychotherapy 30 min - CPT code 21012    The patient's last visit with me was on 8/15/2018.    Problem list  Patient Active Problem List   Diagnosis    Osteopenia of spine    Bone fracture    Malignant neoplasm of overlapping sites of left breast in female, estrogen receptor positive    Moderate episode of recurrent major depressive disorder    Anxiety    Generalized anxiety disorder    Psychophysiological insomnia       Chief complaint/reason for encounter: insomnia, depression and anxiety   Met with patient to evaluate psychosocial adaptation to diagnosis/treatment/survivorship of breast cancer and caregiving of a cancer patient    Current Medications  Current Outpatient Medications   Medication    acetaminophen (TYLENOL) 500 MG tablet    amoxicillin (AMOXIL) 875 MG tablet    b complex vitamins tablet    cloNIDine (CATAPRES) 0.1 MG tablet    clopidogrel (PLAVIX) 75 mg tablet    clopidogrel (PLAVIX) 75 mg tablet    cyanocobalamin 2000 MCG tablet    DULoxetine (CYMBALTA) 60 MG capsule    eluxadoline (VIBERZI) 75 mg Tab    esomeprazole (NEXIUM) 40 MG capsule    exemestane (AROMASIN) 25 mg tablet    ezetimibe (ZETIA) 10 mg tablet    ferrous sulfate 325 (65 FE) MG EC tablet    furosemide (LASIX) 20 MG tablet    isosorbide mononitrate (IMDUR) 60 MG 24 hr tablet    KLOR-CON M15 15 mEq tablet    levothyroxine (SYNTHROID) 150 MCG tablet    lorazepam (ATIVAN) 0.5 MG tablet    LORazepam (ATIVAN) 0.5 MG tablet    metronidazole 1% (METROGEL) 1 % Gel    montelukast (SINGULAIR) 10 mg tablet    nebivolol (BYSTOLIC) 5 MG Tab    NEUPRO 4 mg/24 hour    nitroGLYCERIN (NITROSTAT) 0.4 MG SL tablet    ondansetron (ZOFRAN-ODT) 8 MG TbDL    pantoprazole (PROTONIX) 40 MG tablet    potassium chloride (MICRO-K) 10 MEQ CpSR    potassium  chloride (MICRO-K) 10 MEQ CpSR    RANEXA 1,000 mg Tb12    spironolactone (ALDACTONE) 25 MG tablet    topiramate (TOPAMAX) 50 MG tablet    valacyclovir (VALTREX) 1000 MG tablet    vitamin D 1000 units Tab     No current facility-administered medications for this visit.        Objective:  Faby Walker arrived on time for the session.  Ms. Walker was independently ambulatory at the time of session. The patient was fully cooperative throughout the session.  Appearance: age appropriate,  casually dressed, adequately groomed  Behavior/Cooperation: friendly and cooperative  Speech: Not pressured, clearly audible, no slurring  Mood: anxious and depressed  Affect: mildly constricted  Thought Process: goal-directed, logical  Thought Content: normal,  No delusions or paranoia; did not appear to be responding to internal stimuli during the session  Orientation: grossly intact  Memory: Grossly intact  Attention Span/Concentration: Attends to session without distraction; reports no difficulty  Fund of Knowledge: average  Estimate of Intelligence: average from verbal skills and history  Cognition: grossly intact  Insight: patient has awareness of illness; good insight into own behavior and behavior of others  Judgment: the patient's behavior is adequate to circumstances    Interval history and content of current session: Patient discussed adaptation to caregiving. Discussed strains of 's ongoing depression/anxiety. Discussed current adaptation to disease and treatment status and adaptation to 's illness. Reports to be coping with moderate difficulty. Evaluated cognitive response, paying particular attention to negative intrusive thoughts of a persistent and detrimental nature. Thoughts of this type are rarely present. Provided cognitive behavioral therapy to address negative cognitions. Identified and evaluated psychosocial and environmental stressors secondary to diagnosis and treatment.  Examined  proactive behaviors that may be implemented to minimize or ameliorate psychosocial stressors secondary to diagnosis and treatment.  Has increased independent activity since the time of last visit.     Risk parameters:   Patient reports no suicidal ideation  Patient reports no homicidal ideation  Patient reports no self-injurious behavior  Patient reports no violent behavior   Safety needs:  None at this time      Verbal deficits: None     Patient's response to intervention:The patient's response to intervention is accepting.     Progress toward goals and other mental status changes:  The patient's progress toward goals is good.      Progress to date:Progress as Expected      Goals from last visit: partially met from last visit      Patient reported outcomes:  Distress Thermometer:   Distress Score    Distress Score: 3        Practical Problems Physical Problems                                                   Family Problems                                         Emotional Problems                                                         Spiritual/Religions Concerns               Other Problems             PHQ-9= 6(16 at intake)   JOSE ENRIQUE-7= 2 (13 at intake)      Client Strengths: verbal, intelligent, successful, good social support, commitment to wellness    Diagnosis:     ICD-10-CM ICD-9-CM   1. Generalized anxiety disorder F41.1 300.02   2. Moderate episode of recurrent major depressive disorder F33.1 296.32   3. Psychophysiological insomnia F51.04 307.42       Treatment Plan:individual psychotherapy and medication management by physician  · Target symptoms: depression, anxiety , sleep  · Why chosen therapy is appropriate versus another modality: relevant to diagnosis, evidence based practice  · Outcome monitoring methods: self-report, checklist/rating scale  · Therapeutic intervention type: behavior modifying psychotherapy  · Prognosis: Good      Behavioral goals:    Exercise:  yoga/ela chi   Stress management:   Meditation classes, BeWell relaxation exercises   Social engagement:   Nutrition:   Smoking Cessation:   Therapy:  See NP about sleep difficulties     FG book    Roberto Eli book    Return to clinic: 2 weeks     Length of Service (minutes direct face-to-face contact): 30    Han Graf, PhD  [place] License #[id number]

## 2018-09-12 ENCOUNTER — OFFICE VISIT (OUTPATIENT)
Dept: PSYCHIATRY | Facility: CLINIC | Age: 63
End: 2018-09-12
Payer: COMMERCIAL

## 2018-09-12 DIAGNOSIS — F33.1 MODERATE EPISODE OF RECURRENT MAJOR DEPRESSIVE DISORDER: ICD-10-CM

## 2018-09-12 DIAGNOSIS — F41.1 GENERALIZED ANXIETY DISORDER: Primary | ICD-10-CM

## 2018-09-12 PROCEDURE — 90834 PSYTX W PT 45 MINUTES: CPT | Mod: S$GLB,,, | Performed by: PSYCHOLOGIST

## 2018-09-12 PROCEDURE — 99999 PR PBB SHADOW E&M-EST. PATIENT-LVL II: CPT | Mod: PBBFAC,,, | Performed by: PSYCHOLOGIST

## 2018-09-12 RX ORDER — DULOXETIN HYDROCHLORIDE 30 MG/1
CAPSULE, DELAYED RELEASE ORAL
COMMUNITY
Start: 2018-09-06

## 2018-09-12 RX ORDER — ESZOPICLONE 3 MG/1
TABLET, FILM COATED ORAL
COMMUNITY
Start: 2018-09-06 | End: 2019-07-11

## 2018-09-12 NOTE — PROGRESS NOTES
PSYCHO-ONCOLOGY NOTE/ Individual Psychotherapy     Date: 9/12/2018   Site:  [facility]        Therapeutic Intervention: Met with patient. Outpatient - Behavior modifying psychotherapy 45 min - CPT code 13851    The patient's last visit with me was on 8/28/2018.    Problem list  Patient Active Problem List   Diagnosis    Osteopenia of spine    Bone fracture    Malignant neoplasm of overlapping sites of left breast in female, estrogen receptor positive    Moderate episode of recurrent major depressive disorder    Anxiety    Generalized anxiety disorder    Psychophysiological insomnia       Chief complaint/reason for encounter: insomnia, depression and anxiety   Met with patient to evaluate psychosocial adaptation to diagnosis/treatment/survivorship of breast cancer and caregiving of a cancer patient    Current Medications  Current Outpatient Medications   Medication    acetaminophen (TYLENOL) 500 MG tablet    amoxicillin (AMOXIL) 875 MG tablet    b complex vitamins tablet    cloNIDine (CATAPRES) 0.1 MG tablet    clopidogrel (PLAVIX) 75 mg tablet    cyanocobalamin 2000 MCG tablet    DULoxetine (CYMBALTA) 30 MG capsule    DULoxetine (CYMBALTA) 60 MG capsule    eluxadoline (VIBERZI) 75 mg Tab    esomeprazole (NEXIUM) 40 MG capsule    eszopiclone 3 mg Tab    exemestane (AROMASIN) 25 mg tablet    ezetimibe (ZETIA) 10 mg tablet    ferrous sulfate 325 (65 FE) MG EC tablet    furosemide (LASIX) 20 MG tablet    isosorbide mononitrate (IMDUR) 60 MG 24 hr tablet    KLOR-CON M15 15 mEq tablet    levothyroxine (SYNTHROID) 150 MCG tablet    LORazepam (ATIVAN) 0.5 MG tablet    metronidazole 1% (METROGEL) 1 % Gel    montelukast (SINGULAIR) 10 mg tablet    nebivolol (BYSTOLIC) 5 MG Tab    NEUPRO 4 mg/24 hour    nitroGLYCERIN (NITROSTAT) 0.4 MG SL tablet    ondansetron (ZOFRAN-ODT) 8 MG TbDL    pantoprazole (PROTONIX) 40 MG tablet    potassium chloride (MICRO-K) 10 MEQ CpSR    RANEXA 1,000 mg Tb12     spironolactone (ALDACTONE) 25 MG tablet    topiramate (TOPAMAX) 50 MG tablet    valacyclovir (VALTREX) 1000 MG tablet    vitamin D 1000 units Tab     No current facility-administered medications for this visit.        Objective:  Faby Walker arrived 20 minutes late for the session.  Ms. Walker was independently ambulatory at the time of session. The patient was fully cooperative throughout the session.  Appearance: age appropriate,  casually dressed, adequately groomed  Behavior/Cooperation: friendly and cooperative  Speech: Not pressured, clearly audible, no slurring  Mood: anxious and depressed  Affect: mildly constricted  Thought Process: goal-directed, logical  Thought Content: normal,  No delusions or paranoia; did not appear to be responding to internal stimuli during the session  Orientation: grossly intact  Memory: Grossly intact  Attention Span/Concentration: Attends to session without distraction; reports no difficulty  Fund of Knowledge: average  Estimate of Intelligence: average from verbal skills and history  Cognition: grossly intact  Insight: patient has awareness of illness; good insight into own behavior and behavior of others  Judgment: the patient's behavior is adequate to circumstances    Interval history and content of current session: Patient discussed adaptation to caregiving. Discussed strains of 's ongoing depression/anxiety. Discussed current adaptation to disease and treatment status and adaptation to 's illness. Reports to be coping with moderate difficulty. Evaluated cognitive response, paying particular attention to negative intrusive thoughts of a persistent and detrimental nature. Thoughts of this type are present and causing moderate distress. Provided cognitive behavioral therapy to address negative cognitions. Identified and evaluated psychosocial and environmental stressors secondary to diagnosis and treatment.  Examined proactive behaviors that may be  implemented to minimize or ameliorate psychosocial stressors secondary to diagnosis and treatment.  Has continued with independent activity since the time of last visit.  Discussed strains with 's children and tendency to eat when distressed.    Did see NP and was prescribed Lunesta- working well for her. Cymbalta dose was changed to 90 mg/day (divided dose)     Risk parameters:   Patient reports no suicidal ideation  Patient reports no homicidal ideation  Patient reports no self-injurious behavior  Patient reports no violent behavior   Safety needs:  None at this time      Verbal deficits: None     Patient's response to intervention:The patient's response to intervention is accepting.     Progress toward goals and other mental status changes:  The patient's progress toward goals is good.      Progress to date:Progress as Expected      Goals from last visit: partially met from last visit      Patient reported outcomes:  Distress Thermometer:   Distress Score    Distress Score: 6        Practical Problems Physical Problems     Appearance: Yes     Bathing / Dressing: Yes          Transportation: Yes              Treatment Decisions: Yes  Diarrhea: Yes     Eating: Yes    Family Problems Fatigue: Yes           Dealing with Partner: Yes              Family Health Issues: Yes  Indigestion: Yes     Memory / Concentration: Yes   Emotional Problems      Depression: Yes       Fears: Yes  Nose Dry / Congested: Yes    Nervousness: Yes  Pain: Yes    Sadness: Yes      Worry: Yes Skin Dry / Itchy: Yes                 Spiritual/Religions Concerns               Other Problems             PHQ-9= 10(16 at intake)   JOSE ENRIQUE-7= 7 (13 at intake)      Client Strengths: verbal, intelligent, successful, good social support, commitment to wellness    Diagnosis:     ICD-10-CM ICD-9-CM   1. Generalized anxiety disorder F41.1 300.02   2. Moderate episode of recurrent major depressive disorder F33.1 296.32       Treatment Plan:individual  psychotherapy and medication management by physician  · Target symptoms: depression, anxiety , sleep  · Why chosen therapy is appropriate versus another modality: relevant to diagnosis, evidence based practice  · Outcome monitoring methods: self-report, checklist/rating scale  · Therapeutic intervention type: behavior modifying psychotherapy  · Prognosis: Good      Behavioral goals:    Exercise:  yoga/ela chi   Stress management:  Meditation isauro, BeWell relaxation exercises- use meditation/relaxation before eating   Social engagement: time with daughter   Nutrition:   Smoking Cessation:   Therapy:  continue to work in [employer] book        Return to clinic: 2 weeks     Length of Service (minutes direct face-to-face contact): 45    Han Graf, PhD  [place] License #[id number]

## 2018-10-03 ENCOUNTER — OFFICE VISIT (OUTPATIENT)
Dept: PSYCHIATRY | Facility: CLINIC | Age: 63
End: 2018-10-03
Payer: COMMERCIAL

## 2018-10-03 DIAGNOSIS — F33.1 MODERATE EPISODE OF RECURRENT MAJOR DEPRESSIVE DISORDER: Primary | ICD-10-CM

## 2018-10-03 DIAGNOSIS — F41.1 GENERALIZED ANXIETY DISORDER: ICD-10-CM

## 2018-10-03 PROCEDURE — 99999 PR PBB SHADOW E&M-EST. PATIENT-LVL II: CPT | Mod: PBBFAC,,, | Performed by: PSYCHOLOGIST

## 2018-10-03 PROCEDURE — 90834 PSYTX W PT 45 MINUTES: CPT | Mod: S$GLB,,, | Performed by: PSYCHOLOGIST

## 2018-10-03 NOTE — PROGRESS NOTES
PSYCHO-ONCOLOGY NOTE/ Individual Psychotherapy     Date: 10/3/2018   Site:  [facility]        Therapeutic Intervention: Met with patient. Outpatient - Behavior modifying psychotherapy 45 min - CPT code 36735    The patient's last visit with me was on 9/12/2018.    Problem list  Patient Active Problem List   Diagnosis    Osteopenia of spine    Bone fracture    Malignant neoplasm of overlapping sites of left breast in female, estrogen receptor positive    Moderate episode of recurrent major depressive disorder    Anxiety    Generalized anxiety disorder    Psychophysiological insomnia       Chief complaint/reason for encounter: insomnia, depression and anxiety   Met with patient to evaluate psychosocial adaptation to diagnosis/treatment/survivorship of breast cancer and caregiving of a cancer patient    Current Medications  Current Outpatient Medications   Medication    acetaminophen (TYLENOL) 500 MG tablet    amoxicillin (AMOXIL) 875 MG tablet    b complex vitamins tablet    cloNIDine (CATAPRES) 0.1 MG tablet    clopidogrel (PLAVIX) 75 mg tablet    cyanocobalamin 2000 MCG tablet    DULoxetine (CYMBALTA) 30 MG capsule    DULoxetine (CYMBALTA) 60 MG capsule    eluxadoline (VIBERZI) 75 mg Tab    esomeprazole (NEXIUM) 40 MG capsule    eszopiclone 3 mg Tab    exemestane (AROMASIN) 25 mg tablet    ezetimibe (ZETIA) 10 mg tablet    ferrous sulfate 325 (65 FE) MG EC tablet    furosemide (LASIX) 20 MG tablet    isosorbide mononitrate (IMDUR) 60 MG 24 hr tablet    KLOR-CON M15 15 mEq tablet    levothyroxine (SYNTHROID) 150 MCG tablet    LORazepam (ATIVAN) 0.5 MG tablet    metronidazole 1% (METROGEL) 1 % Gel    montelukast (SINGULAIR) 10 mg tablet    nebivolol (BYSTOLIC) 5 MG Tab    NEUPRO 4 mg/24 hour    nitroGLYCERIN (NITROSTAT) 0.4 MG SL tablet    ondansetron (ZOFRAN-ODT) 8 MG TbDL    pantoprazole (PROTONIX) 40 MG tablet    potassium chloride (MICRO-K) 10 MEQ CpSR    RANEXA 1,000 mg Tb12  "   spironolactone (ALDACTONE) 25 MG tablet    topiramate (TOPAMAX) 50 MG tablet    valacyclovir (VALTREX) 1000 MG tablet    vitamin D 1000 units Tab     No current facility-administered medications for this visit.        Objective:  Faby Walker arrived on time for the session.  Ms. Walker was independently ambulatory at the time of session. The patient was fully cooperative throughout the session.  Appearance: age appropriate,  casually dressed, adequately groomed  Behavior/Cooperation: friendly and cooperative  Speech: Not pressured, clearly audible, no slurring  Mood: dysphoric  Affect: mildly constricted  Thought Process: goal-directed, logical  Thought Content: normal,  No delusions or paranoia; did not appear to be responding to internal stimuli during the session  Orientation: grossly intact  Memory: Grossly intact  Attention Span/Concentration: Attends to session without distraction; reports no difficulty  Fund of Knowledge: average  Estimate of Intelligence: average from verbal skills and history  Cognition: grossly intact  Insight: patient has awareness of illness; good insight into own behavior and behavior of others  Judgment: the patient's behavior is adequate to circumstances    Interval history and content of current session: Patient discussed recent improvement in coping due to use of cognitive monitoring. Discussed current adaptation to disease and treatment status and adaptation to 's illness. Reports to be coping with moderate difficulty. Has been "panicky" about his progression of disease ("I can't cope without him.").   Examined proactive behaviors that may be implemented to minimize or ameliorate psychosocial stressors secondary to diagnosis and treatment.  Has continued with independent activity since the time of last visit.  Discussed recent good visit with 's children.    Continues to use Lunesta & Cymbalta - No AE     Risk parameters:   Patient reports no suicidal " ideation  Patient reports no homicidal ideation  Patient reports no self-injurious behavior  Patient reports no violent behavior   Safety needs:  None at this time      Verbal deficits: None     Patient's response to intervention:The patient's response to intervention is accepting.     Progress toward goals and other mental status changes:  The patient's progress toward goals is good.      Progress to date:Progress as Expected      Goals from last visit: partially met from last visit      Patient reported outcomes:  Distress Thermometer:   Distress Score    Distress Score: 5        Practical Problems Physical Problems                                                   Family Problems                                         Emotional Problems                                                         Spiritual/Religions Concerns               Other Problems             PHQ-9= 5(16 at intake)   JOSE ENRIQUE-7= 4 (13 at intake)      Client Strengths: verbal, intelligent, successful, good social support, commitment to wellness    Diagnosis:     ICD-10-CM ICD-9-CM   1. Moderate episode of recurrent major depressive disorder F33.1 296.32   2. Generalized anxiety disorder F41.1 300.02       Treatment Plan:individual psychotherapy and medication management by physician  · Target symptoms: depression, anxiety , sleep  · Why chosen therapy is appropriate versus another modality: relevant to diagnosis, evidence based practice  · Outcome monitoring methods: self-report, checklist/rating scale  · Therapeutic intervention type: behavior modifying psychotherapy  · Prognosis: Good      Behavioral goals:    Exercise:  yoga/ela chi   Stress management:  Meditation isauro- use meditation when anxious   Social engagement: time with daughter   Nutrition:   Smoking Cessation:   Therapy:  continue to work in [employer] book        Return to clinic: 2 weeks     Length of Service (minutes direct face-to-face contact): 45    Han Graf, PhD  [place] License  #[id number]

## 2018-10-22 ENCOUNTER — OFFICE VISIT (OUTPATIENT)
Dept: PSYCHIATRY | Facility: CLINIC | Age: 63
End: 2018-10-22
Payer: COMMERCIAL

## 2018-10-22 DIAGNOSIS — F41.1 GENERALIZED ANXIETY DISORDER: Primary | ICD-10-CM

## 2018-10-22 DIAGNOSIS — F33.1 MODERATE EPISODE OF RECURRENT MAJOR DEPRESSIVE DISORDER: ICD-10-CM

## 2018-10-22 PROCEDURE — 90834 PSYTX W PT 45 MINUTES: CPT | Mod: S$GLB,,, | Performed by: PSYCHOLOGIST

## 2018-10-22 PROCEDURE — 99999 PR PBB SHADOW E&M-EST. PATIENT-LVL II: CPT | Mod: PBBFAC,,, | Performed by: PSYCHOLOGIST

## 2018-10-22 NOTE — PROGRESS NOTES
PSYCHO-ONCOLOGY NOTE/ Individual Psychotherapy     Date: 10/22/2018   Site:  [facility]        Therapeutic Intervention: Met with patient. Outpatient - Behavior modifying psychotherapy 45 min - CPT code 80710    The patient's last visit with me was on 10/3/2018.    Problem list  Patient Active Problem List   Diagnosis    Osteopenia of spine    Bone fracture    Malignant neoplasm of overlapping sites of left breast in female, estrogen receptor positive    Moderate episode of recurrent major depressive disorder    Anxiety    Generalized anxiety disorder    Psychophysiological insomnia       Chief complaint/reason for encounter: insomnia, depression and anxiety   Met with patient to evaluate psychosocial adaptation to diagnosis/treatment/survivorship of breast cancer and caregiving of a cancer patient    Current Medications  Current Outpatient Medications   Medication    acetaminophen (TYLENOL) 500 MG tablet    amoxicillin (AMOXIL) 875 MG tablet    b complex vitamins tablet    cloNIDine (CATAPRES) 0.1 MG tablet    clopidogrel (PLAVIX) 75 mg tablet    cyanocobalamin 2000 MCG tablet    DULoxetine (CYMBALTA) 30 MG capsule    DULoxetine (CYMBALTA) 60 MG capsule    eluxadoline (VIBERZI) 75 mg Tab    esomeprazole (NEXIUM) 40 MG capsule    eszopiclone 3 mg Tab    exemestane (AROMASIN) 25 mg tablet    ezetimibe (ZETIA) 10 mg tablet    ferrous sulfate 325 (65 FE) MG EC tablet    furosemide (LASIX) 20 MG tablet    isosorbide mononitrate (IMDUR) 60 MG 24 hr tablet    KLOR-CON M15 15 mEq tablet    levothyroxine (SYNTHROID) 150 MCG tablet    LORazepam (ATIVAN) 0.5 MG tablet    metronidazole 1% (METROGEL) 1 % Gel    montelukast (SINGULAIR) 10 mg tablet    nebivolol (BYSTOLIC) 5 MG Tab    NEUPRO 4 mg/24 hour    nitroGLYCERIN (NITROSTAT) 0.4 MG SL tablet    ondansetron (ZOFRAN-ODT) 8 MG TbDL    pantoprazole (PROTONIX) 40 MG tablet    potassium chloride (MICRO-K) 10 MEQ CpSR    RANEXA 1,000 mg Tb12     spironolactone (ALDACTONE) 25 MG tablet    topiramate (TOPAMAX) 50 MG tablet    valacyclovir (VALTREX) 1000 MG tablet    vitamin D 1000 units Tab     No current facility-administered medications for this visit.        Objective:  Faby Walker arrived on time for the session.  Ms. Walker was independently ambulatory at the time of session. The patient was fully cooperative throughout the session.  Appearance: age appropriate,  casually dressed, adequately groomed  Behavior/Cooperation: friendly and cooperative  Speech: Not pressured, clearly audible, no slurring  Mood: euthymic  Affect: WNL  Thought Process: goal-directed, logical  Thought Content: normal,  No delusions or paranoia; did not appear to be responding to internal stimuli during the session  Orientation: grossly intact  Memory: Grossly intact  Attention Span/Concentration: Attends to session without distraction; reports no difficulty  Fund of Knowledge: average  Estimate of Intelligence: average from verbal skills and history  Cognition: grossly intact  Insight: patient has awareness of illness; good insight into own behavior and behavior of others  Judgment: the patient's behavior is adequate to circumstances    Interval history and content of current session: Patient discussed recent improvement in coping and activity planning. Discussed current adaptation to disease and treatment status and adaptation to 's illness. Reports to be coping with moderate difficulty. She has been assertive with him re: his (perceived) self-sabotage and the impact of his poor pain management on their interpersonal communication. Patient has lost 5 lbs on keto diet. Discussed abstinence violation effect and ways to plan for diet deviations in order to maximize longer term success. Discussed recent good visit with 's children.    Continues to use Lunesta & Cymbalta - No AE     Risk parameters:   Patient reports no suicidal ideation  Patient reports no  homicidal ideation  Patient reports no self-injurious behavior  Patient reports no violent behavior   Safety needs:  None at this time      Verbal deficits: None     Patient's response to intervention:The patient's response to intervention is accepting.     Progress toward goals and other mental status changes:  The patient's progress toward goals is good.      Progress to date:Progress as Expected      Goals from last visit: partially met from last visit      Patient reported outcomes:  Distress Thermometer:   Distress Score             Practical Problems Physical Problems                                                   Family Problems                                         Emotional Problems                                                         Spiritual/Religions Concerns               Other Problems             PHQ-9= 4(16 at intake)   JOSE ENRIQUE-7= 4 (13 at intake)      Client Strengths: verbal, intelligent, successful, good social support, commitment to wellness    Diagnosis:     ICD-10-CM ICD-9-CM   1. Generalized anxiety disorder F41.1 300.02   2. Moderate episode of recurrent major depressive disorder F33.1 296.32       Treatment Plan:individual psychotherapy and medication management by physician  · Target symptoms: depression, anxiety , sleep  · Why chosen therapy is appropriate versus another modality: relevant to diagnosis, evidence based practice  · Outcome monitoring methods: self-report, checklist/rating scale  · Therapeutic intervention type: behavior modifying psychotherapy  · Prognosis: Good      Behavioral goals:    Exercise:  yoga/ela chi   Stress management:  Meditation isauro- use meditation when anxious   Social engagement: time with daughter   Nutrition:   Smoking Cessation:   Therapy:  continue to work in [facility] book        Return to clinic: 2 weeks     Length of Service (minutes direct face-to-face contact): 45    Han Graf, PhD  [place] License #[id number]

## 2018-10-23 DIAGNOSIS — Z17.0 MALIGNANT NEOPLASM OF OVERLAPPING SITES OF LEFT BREAST IN FEMALE, ESTROGEN RECEPTOR POSITIVE: Primary | ICD-10-CM

## 2018-10-23 DIAGNOSIS — M54.9 BACK PAIN, UNSPECIFIED BACK LOCATION, UNSPECIFIED BACK PAIN LATERALITY, UNSPECIFIED CHRONICITY: ICD-10-CM

## 2018-10-23 DIAGNOSIS — C50.812 MALIGNANT NEOPLASM OF OVERLAPPING SITES OF LEFT BREAST IN FEMALE, ESTROGEN RECEPTOR POSITIVE: Primary | ICD-10-CM

## 2018-10-24 ENCOUNTER — TELEPHONE (OUTPATIENT)
Dept: HEMATOLOGY/ONCOLOGY | Facility: CLINIC | Age: 63
End: 2018-10-24

## 2018-10-24 ENCOUNTER — PATIENT MESSAGE (OUTPATIENT)
Dept: HEMATOLOGY/ONCOLOGY | Facility: CLINIC | Age: 63
End: 2018-10-24

## 2018-10-24 DIAGNOSIS — M54.50 LOW BACK PAIN, NON-SPECIFIC: ICD-10-CM

## 2018-10-24 DIAGNOSIS — Z17.0 MALIGNANT NEOPLASM OF OVERLAPPING SITES OF LEFT BREAST IN FEMALE, ESTROGEN RECEPTOR POSITIVE: Primary | ICD-10-CM

## 2018-10-24 DIAGNOSIS — M54.31 SCIATICA OF RIGHT SIDE: Primary | ICD-10-CM

## 2018-10-24 DIAGNOSIS — C50.812 MALIGNANT NEOPLASM OF OVERLAPPING SITES OF LEFT BREAST IN FEMALE, ESTROGEN RECEPTOR POSITIVE: Primary | ICD-10-CM

## 2018-10-24 NOTE — TELEPHONE ENCOUNTER
Received call from CT at [facility] given patient is currently scheduled to receive CT lumbar/spine for suspected metastasis, per radiologist patient would benefit more from MRI imaging. Relayed information to Dr. Rios, orders to be changed appropriately.

## 2018-10-24 NOTE — TELEPHONE ENCOUNTER
Phoned the patient's  and informed her that her appointment CT scan has been changed to a MRI. Patient's scheduled at the same time at the [facility] location. Patient verbally understand and was given the date and time.

## 2018-10-25 ENCOUNTER — HOSPITAL ENCOUNTER (OUTPATIENT)
Dept: RADIOLOGY | Facility: OTHER | Age: 63
Discharge: HOME | End: 2018-10-25
Attending: INTERNAL MEDICINE
Payer: COMMERCIAL

## 2018-10-25 DIAGNOSIS — M54.31 SCIATICA OF RIGHT SIDE: ICD-10-CM

## 2018-10-25 DIAGNOSIS — M54.31 SCIATICA OF RIGHT SIDE: Primary | ICD-10-CM

## 2018-10-25 PROCEDURE — 72158 MRI LUMBAR SPINE W/O & W/DYE: CPT | Mod: TC

## 2018-10-25 PROCEDURE — A9585 GADOBUTROL INJECTION: HCPCS | Performed by: INTERNAL MEDICINE

## 2018-10-25 PROCEDURE — 72157 MRI CHEST SPINE W/O & W/DYE: CPT | Mod: 26,,, | Performed by: RADIOLOGY

## 2018-10-25 PROCEDURE — 72158 MRI LUMBAR SPINE W/O & W/DYE: CPT | Mod: 26,,, | Performed by: RADIOLOGY

## 2018-10-25 PROCEDURE — 72157 MRI CHEST SPINE W/O & W/DYE: CPT | Mod: TC

## 2018-10-25 PROCEDURE — 25500020 PHARM REV CODE 255: Performed by: INTERNAL MEDICINE

## 2018-10-25 RX ORDER — GADOBUTROL 604.72 MG/ML
10 INJECTION INTRAVENOUS
Status: COMPLETED | OUTPATIENT
Start: 2018-10-25 | End: 2018-10-25

## 2018-10-25 RX ADMIN — GADOBUTROL 10 ML: 604.72 INJECTION INTRAVENOUS at 12:10

## 2018-11-05 RX ORDER — SODIUM CHLORIDE 0.9 % (FLUSH) 0.9 %
10 SYRINGE (ML) INJECTION
Status: CANCELLED | OUTPATIENT
Start: 2018-11-05

## 2018-11-05 RX ORDER — HEPARIN 100 UNIT/ML
500 SYRINGE INTRAVENOUS
Status: CANCELLED | OUTPATIENT
Start: 2018-11-05

## 2018-11-13 ENCOUNTER — HOSPITAL ENCOUNTER (OUTPATIENT)
Dept: RADIOLOGY | Facility: HOSPITAL | Age: 63
Discharge: HOME | End: 2018-11-13
Attending: INTERNAL MEDICINE
Payer: COMMERCIAL

## 2018-11-13 ENCOUNTER — INFUSION (OUTPATIENT)
Dept: INFUSION THERAPY | Facility: OTHER | Age: 63
End: 2018-11-13
Attending: INTERNAL MEDICINE
Payer: COMMERCIAL

## 2018-11-13 ENCOUNTER — HOSPITAL ENCOUNTER (OUTPATIENT)
Dept: RADIOLOGY | Facility: OTHER | Age: 63
Discharge: HOME | End: 2018-11-13
Attending: ANESTHESIOLOGY
Payer: COMMERCIAL

## 2018-11-13 ENCOUNTER — OFFICE VISIT (OUTPATIENT)
Dept: HEMATOLOGY/ONCOLOGY | Facility: CLINIC | Age: 63
End: 2018-11-13
Payer: COMMERCIAL

## 2018-11-13 ENCOUNTER — OFFICE VISIT (OUTPATIENT)
Dept: PAIN MEDICINE | Facility: CLINIC | Age: 63
End: 2018-11-13
Attending: ANESTHESIOLOGY
Payer: COMMERCIAL

## 2018-11-13 VITALS
WEIGHT: 245.81 LBS | DIASTOLIC BLOOD PRESSURE: 79 MMHG | SYSTOLIC BLOOD PRESSURE: 118 MMHG | BODY MASS INDEX: 41.97 KG/M2 | HEIGHT: 64 IN | HEART RATE: 67 BPM | TEMPERATURE: 98 F | RESPIRATION RATE: 16 BRPM | OXYGEN SATURATION: 96 %

## 2018-11-13 VITALS
HEIGHT: 64 IN | WEIGHT: 245.56 LBS | HEART RATE: 69 BPM | TEMPERATURE: 98 F | BODY MASS INDEX: 41.92 KG/M2 | SYSTOLIC BLOOD PRESSURE: 102 MMHG | DIASTOLIC BLOOD PRESSURE: 68 MMHG

## 2018-11-13 DIAGNOSIS — M70.61 TROCHANTERIC BURSITIS OF RIGHT HIP: ICD-10-CM

## 2018-11-13 DIAGNOSIS — Z17.0 MALIGNANT NEOPLASM OF OVERLAPPING SITES OF LEFT BREAST IN FEMALE, ESTROGEN RECEPTOR POSITIVE: Primary | ICD-10-CM

## 2018-11-13 DIAGNOSIS — M89.8X9 CANCER TREATMENT INDUCED BONE LOSS: ICD-10-CM

## 2018-11-13 DIAGNOSIS — N64.4 BREAST PAIN, LEFT: ICD-10-CM

## 2018-11-13 DIAGNOSIS — M25.50 ARTHRALGIA, UNSPECIFIED JOINT: ICD-10-CM

## 2018-11-13 DIAGNOSIS — Z85.3 HISTORY OF CANCER OF LEFT BREAST: ICD-10-CM

## 2018-11-13 DIAGNOSIS — L53.9 ERYTHEMA: ICD-10-CM

## 2018-11-13 DIAGNOSIS — R20.0 BILATERAL HAND NUMBNESS: ICD-10-CM

## 2018-11-13 DIAGNOSIS — M85.80 OSTEOPENIA, UNSPECIFIED LOCATION: ICD-10-CM

## 2018-11-13 DIAGNOSIS — R93.7 ABNORMAL X-RAY OF CERVICAL SPINE: ICD-10-CM

## 2018-11-13 DIAGNOSIS — M51.36 DDD (DEGENERATIVE DISC DISEASE), LUMBAR: ICD-10-CM

## 2018-11-13 DIAGNOSIS — M70.61 TROCHANTERIC BURSITIS OF RIGHT HIP: Primary | ICD-10-CM

## 2018-11-13 DIAGNOSIS — M85.88 OSTEOPENIA OF SPINE: Primary | ICD-10-CM

## 2018-11-13 DIAGNOSIS — T14.8XXA BONE FRACTURE: ICD-10-CM

## 2018-11-13 DIAGNOSIS — T45.1X5A CANCER TREATMENT INDUCED BONE LOSS: ICD-10-CM

## 2018-11-13 DIAGNOSIS — M43.06 SPONDYLOLYSIS OF LUMBAR REGION: ICD-10-CM

## 2018-11-13 DIAGNOSIS — F33.1 MODERATE EPISODE OF RECURRENT MAJOR DEPRESSIVE DISORDER: ICD-10-CM

## 2018-11-13 DIAGNOSIS — C50.812 MALIGNANT NEOPLASM OF OVERLAPPING SITES OF LEFT BREAST IN FEMALE, ESTROGEN RECEPTOR POSITIVE: Primary | ICD-10-CM

## 2018-11-13 PROCEDURE — 96365 THER/PROPH/DIAG IV INF INIT: CPT

## 2018-11-13 PROCEDURE — 99999 PR PBB SHADOW E&M-EST. PATIENT-LVL III: CPT | Mod: PBBFAC,,, | Performed by: INTERNAL MEDICINE

## 2018-11-13 PROCEDURE — 99245 OFF/OP CONSLTJ NEW/EST HI 55: CPT | Mod: S$GLB,,, | Performed by: ANESTHESIOLOGY

## 2018-11-13 PROCEDURE — 77077 JOINT SURVEY SINGLE VIEW: CPT | Mod: TC

## 2018-11-13 PROCEDURE — 77077 JOINT SURVEY SINGLE VIEW: CPT | Mod: 26,,, | Performed by: RADIOLOGY

## 2018-11-13 PROCEDURE — 77061 BREAST TOMOSYNTHESIS UNI: CPT | Mod: 26,LT,, | Performed by: RADIOLOGY

## 2018-11-13 PROCEDURE — 3078F DIAST BP <80 MM HG: CPT | Mod: CPTII,S$GLB,, | Performed by: INTERNAL MEDICINE

## 2018-11-13 PROCEDURE — 3008F BODY MASS INDEX DOCD: CPT | Mod: CPTII,S$GLB,, | Performed by: INTERNAL MEDICINE

## 2018-11-13 PROCEDURE — 99999 PR PBB SHADOW E&M-EST. PATIENT-LVL III: CPT | Mod: PBBFAC,,, | Performed by: ANESTHESIOLOGY

## 2018-11-13 PROCEDURE — 63600175 PHARM REV CODE 636 W HCPCS: Performed by: INTERNAL MEDICINE

## 2018-11-13 PROCEDURE — 77065 DX MAMMO INCL CAD UNI: CPT | Mod: 26,LT,, | Performed by: RADIOLOGY

## 2018-11-13 PROCEDURE — 99214 OFFICE O/P EST MOD 30 MIN: CPT | Mod: S$GLB,,, | Performed by: INTERNAL MEDICINE

## 2018-11-13 PROCEDURE — 77061 BREAST TOMOSYNTHESIS UNI: CPT | Mod: TC,PO,LT

## 2018-11-13 PROCEDURE — 77065 DX MAMMO INCL CAD UNI: CPT | Mod: TC,PO,LT

## 2018-11-13 PROCEDURE — 25000003 PHARM REV CODE 250: Performed by: INTERNAL MEDICINE

## 2018-11-13 PROCEDURE — 3074F SYST BP LT 130 MM HG: CPT | Mod: CPTII,S$GLB,, | Performed by: INTERNAL MEDICINE

## 2018-11-13 RX ORDER — HEPARIN 100 UNIT/ML
500 SYRINGE INTRAVENOUS
Status: CANCELLED | OUTPATIENT
Start: 2018-11-13

## 2018-11-13 RX ORDER — SODIUM CHLORIDE 0.9 % (FLUSH) 0.9 %
10 SYRINGE (ML) INJECTION
Status: CANCELLED | OUTPATIENT
Start: 2018-11-13

## 2018-11-13 RX ORDER — SODIUM CHLORIDE 0.9 % (FLUSH) 0.9 %
10 SYRINGE (ML) INJECTION
Status: DISCONTINUED | OUTPATIENT
Start: 2018-11-13 | End: 2018-11-13 | Stop reason: HOSPADM

## 2018-11-13 RX ADMIN — ZOLEDRONIC ACID 4 MG: 4 INJECTION, SOLUTION, CONCENTRATE INTRAVENOUS at 12:11

## 2018-11-13 NOTE — H&P (VIEW-ONLY)
Subjective:      Patient ID: Faby Walker is a 63 y.o. female.    Chief Complaint: Neck Pain; Mid-back Pain; Low-back Pain; Hip Pain (Right side); and Knee Pain (Left Knee)    Referred by: Aden Rios MD     62 yo F presents to clinic as a new patient who presents of chronic right sided leg pain. Pain first started 3 years ago and has progressively worsened within the last 3 months. Prolonged sitting, standing, and walking worsens the pain. Pain is described as starting in her back and travels to her lateral thigh and does not cross the knee. Pain is described as deep aching constant pain. Pain is so severe she has her  rub her lateral thigh nightly for some relief.She is able to ambulate without difficulty but has noticed that she has recently has been limping with pain.     Pt also complains of new onset neck pain. States that she recently started noticing bilateral hand numbness and tingling. Denies and weakness. Denies b/b incontinence.    She is unable to take NSAIDs due to having a previous gastric sleeve. She also uses her 's hydrocodone which alleviates the pain. Tylenol has not provided relief. She has not tried physical therapy. She has tried CBD oil on her back which did help. She is also on cymbalta which was recently increased to 90mg daily. She also takes ativan 0.5mg nightly. She uses a CPAP at night. She has a history of pulm HTN, CHF, and CAD. Pt also has history of left breast cancer and received her last radiation treatment in 2014. Of note, her  was recently diagnosed with breast cancer as well and is currently receiving chemotherapy. Pt also has family history of autoimmune diseases.       Pain Scales  Best: 3/10  Worst: 9/10  Usually: 4/10  Today: 3/10    Neck Pain      Mid-back Pain     Low-back Pain     Hip Pain      Knee Pain          Past Medical History:   Diagnosis Date    Allergy     Anxiety     Asthma     Boils     Back of left thigh, which is not  draining but still hard    Breast cancer     Bronchitis     CHF (congestive heart failure)     Depression     Herpes genitalis in women     Hx of psychiatric care     Wellbutrin, Remeron, Zoloft, Cymbalta, Ativan    Hypertension     Psychiatric problem     Sleep apnea     Sleep difficulties     Therapy     Thyroid disease        Past Surgical History:   Procedure Laterality Date    ADENOIDECTOMY      APPENDECTOMY      BREAST BIOPSY      BREAST LUMPECTOMY       SECTION      CHOLECYSTECTOMY      nodule removed frombuttocks - benign      SINUS SURGERY      TONSILLECTOMY      TRIGGER FINGER RELEASE         Review of patient's allergies indicates:   Allergen Reactions    Tessalon [benzonatate] Other (See Comments)     Throat closes up and jaw locks    Bactrim [sulfamethoxazole-trimethoprim] Hives    Codeine Hives    Colestipol Other (See Comments)     Chills, nausea, vomiting and severe shaking, fatigue and weakness    Cardizem [diltiazem hcl] Other (See Comments)    Percocet [oxycodone-acetaminophen] Hallucinations and Photosensitivity    Percodan [oxycodone hcl-oxycodone-asa] Anxiety    Wellbutrin [bupropion hcl] Itching       Current Outpatient Medications   Medication Sig Dispense Refill    acetaminophen (TYLENOL) 500 MG tablet Take 500 mg by mouth every 6 (six) hours as needed for Pain.      b complex vitamins tablet Take 1 tablet by mouth once daily.      B complex-liver extract Cap Take by mouth.      cloNIDine (CATAPRES) 0.1 MG tablet Take 0.1 mg by mouth every 6 (six) hours as needed.       clopidogrel (PLAVIX) 75 mg tablet Take 75 mg by mouth.      cyanocobalamin 2000 MCG tablet Take 2,000 mcg by mouth once daily.      DULoxetine (CYMBALTA) 30 MG capsule       DULoxetine (CYMBALTA) 60 MG capsule Take 60 mg by mouth.      esomeprazole (NEXIUM) 40 MG capsule       eszopiclone 3 mg Tab       exemestane (AROMASIN) 25 mg tablet Take 1 tablet (25 mg total) by mouth  once daily. 90 tablet 3    ezetimibe (ZETIA) 10 mg tablet       ferrous sulfate 325 (65 FE) MG EC tablet Take 100 mg by mouth.      furosemide (LASIX) 20 MG tablet Take 20 mg by mouth once daily.       isosorbide mononitrate (IMDUR) 60 MG 24 hr tablet       KLOR-CON M15 15 mEq tablet 15 mEq 2 (two) times daily.       levothyroxine (SYNTHROID) 150 MCG tablet Take 150 mcg by mouth.      LORazepam (ATIVAN) 0.5 MG tablet Take 0.5 mg by mouth.      metronidazole 1% (METROGEL) 1 % Gel Apply topically 2 (two) times daily.      montelukast (SINGULAIR) 10 mg tablet Take 10 mg by mouth.      nebivolol (BYSTOLIC) 5 MG Tab Take 5 mg by mouth once daily.       NEUPRO 4 mg/24 hour Place 1 patch onto the skin once daily.       nitroGLYCERIN (NITROSTAT) 0.4 MG SL tablet Place 0.4 mg under the tongue.      ondansetron (ZOFRAN-ODT) 8 MG TbDL Take 1 tablet (8 mg total) by mouth every 6 (six) hours as needed (nausea). (Patient taking differently: Take 4 mg by mouth every 6 (six) hours as needed (nausea). ) 30 tablet 1    potassium chloride (MICRO-K) 10 MEQ CpSR Take 10 mEq by mouth 2 (two) times daily.      RANEXA 1,000 mg Tb12 1,000 mg 2 (two) times daily.       spironolactone (ALDACTONE) 25 MG tablet Take 25 mg by mouth.      topiramate (TOPAMAX) 50 MG tablet       valacyclovir (VALTREX) 1000 MG tablet Take 1,000 mg by mouth 2 (two) times daily.      vitamin D 1000 units Tab Take 2,500 Units by mouth once daily.      amoxicillin (AMOXIL) 875 MG tablet Take 875 mg by mouth every 12 (twelve) hours.      eluxadoline (VIBERZI) 75 mg Tab Take 75 mg by mouth.      pantoprazole (PROTONIX) 40 MG tablet Take 40 mg by mouth once daily.       No current facility-administered medications for this visit.        Family History   Problem Relation Age of Onset    Breast cancer Maternal Aunt     Alcohol abuse Father        Social History     Socioeconomic History    Marital status:      Spouse name: Not on file     "Number of children: 2    Years of education: Not on file    Highest education level: Not on file   Social Needs    Financial resource strain: Not on file    Food insecurity - worry: Not on file    Food insecurity - inability: Not on file    Transportation needs - medical: Not on file    Transportation needs - non-medical: Not on file   Occupational History    Not on file   Tobacco Use    Smoking status: Never Smoker    Smokeless tobacco: Never Used   Substance and Sexual Activity    Alcohol use: No    Drug use: No    Sexual activity: Yes     Partners: Male   Other Topics Concern    Patient feels they ought to cut down on drinking/drug use Not Asked    Patient annoyed by others criticizing their drinking/drug use Not Asked    Patient has felt bad or guilty about drinking/drug use Not Asked    Patient has had a drink/used drugs as an eye opener in the AM Not Asked   Social History Narrative     (x4, 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   HENT: Negative.    Eyes: Negative.    Cardiovascular:        CHF   Respiratory:        Pulm HTN   Endocrine: Negative.    Skin: Negative.    Musculoskeletal: Positive for back pain, joint pain and neck pain.   Gastrointestinal: Negative.    Neurological: Negative.            Objective:   Ht 5' 4" (1.626 m)   Wt 111.4 kg (245 lb 9.5 oz)   BMI 42.16 kg/m²   Pain Disability Index Review:  Last 3 PDI Scores 11/13/2018   Pain Disability Index (PDI) 42     Normocephalic.  Atraumatic.  Affect appropriate.  Breathing unlabored.  Extra ocular muscles intact.  /68   Pulse 69   Temp 98.2 °F (36.8 °C)   Ht 5' 4" (1.626 m)   Wt 111.4 kg (245 lb 9.5 oz)   BMI 42.16 kg/m²     PHYSICAL EXAMINATION:  GEN:  Well developed, well nourished. Obese  No acute distress. Normal pain behavior.  HEENT:  No trauma.  Mucous membranes moist.  Nares patent bilaterally.  PSYCH: Normal affect. Thought content " appropriate.  CHEST:  Breathing symmetric.  No audible wheezing.  ABD: Soft, non-tender, non-distended.  SKIN:  Warm, pink, dry.  No rash on exposed areas.    EXT:  No cyanosis, clubbing, or edema.  No color change or changes in nail or hair growth.    NEURO/MUSCULOSKELETAL:  Fully alert, oriented, and appropriate. Speech normal meenakshi. No cranial nerve deficits.   Gait: Normal.   Strength: 5/5 motor strength throughout bilateral upper and lower extremities myotomes.   Sensory: No sensory deficit in the lower extremities dermatomes.   Reflexes:  2+ and symmetric throughout.  Downgoing Babinski's bilaterally, negative corbett's.  No clonus or spasticity   L-Spine:  Normal ROM without pain. + facet loading bilaterally.  - SLR bilaterally.    No TTP over lumbar paraspinals  SI Joint/Hip: - FROYLAN bilaterally.  - FADIR bilaterally.  TTP over right SI joints, right GTB.    C-Spine: normal ROM without pain. -Spurling bilaterally.      Previous imaging was reviewed and discussed with the patient today.         Ortho/SPM Exam      Assessment:       Encounter Diagnoses   Name Primary?    Trochanteric bursitis of right hip Yes    DDD (degenerative disc disease), lumbar     Spondylolysis of lumbar region     Arthralgia, unspecified joint     History of cancer of left breast     Bilateral hand numbness     Abnormal x-ray of cervical spine          Plan:   We discussed with the patient the assessment and recommendations. The following is the plan we agreed on:  1. We will order labs and xrays to rule out suspected polymyalgia rheumatica since pt has history of cancer and family history of autoimmune disease.  Labs: CBC, CMP, CRP, Sed rate  Xrays: arthrits panel.  2. If labs are positive, we will refer to rheumatology.  3. If labs are negative, we will schedule appt for right GTB, right ITB and trigger point injections.  4. Will order cervical MRI as pt has history of breast cancer and is now with new onset cervical pain  with associated bilateral hand numbness.    Pt seen and discussed with Dr. Allen who agrees with the above.    Parul Hermosillo, DO    I have personally taken the history and examined this patient and agree with the fellow's note as stated above.

## 2018-11-13 NOTE — PLAN OF CARE
Problem: Patient Care Overview (Adult)  Goal: Plan of Care Review  Outcome: Ongoing (interventions implemented as appropriate)  Zometa administered, patient tolerated well. VSS, NAD. D/C'd home with .

## 2018-11-13 NOTE — PROGRESS NOTES
Subjective:      Patient ID: Faby Walker is a 63 y.o. female.    Chief Complaint: Neck Pain; Mid-back Pain; Low-back Pain; Hip Pain (Right side); and Knee Pain (Left Knee)    Referred by: Aden Rios MD     64 yo F presents to clinic as a new patient who presents of chronic right sided leg pain. Pain first started 3 years ago and has progressively worsened within the last 3 months. Prolonged sitting, standing, and walking worsens the pain. Pain is described as starting in her back and travels to her lateral thigh and does not cross the knee. Pain is described as deep aching constant pain. Pain is so severe she has her  rub her lateral thigh nightly for some relief.She is able to ambulate without difficulty but has noticed that she has recently has been limping with pain.     Pt also complains of new onset neck pain. States that she recently started noticing bilateral hand numbness and tingling. Denies and weakness. Denies b/b incontinence.    She is unable to take NSAIDs due to having a previous gastric sleeve. She also uses her 's hydrocodone which alleviates the pain. Tylenol has not provided relief. She has not tried physical therapy. She has tried CBD oil on her back which did help. She is also on cymbalta which was recently increased to 90mg daily. She also takes ativan 0.5mg nightly. She uses a CPAP at night. She has a history of pulm HTN, CHF, and CAD. Pt also has history of left breast cancer and received her last radiation treatment in 2014. Of note, her  was recently diagnosed with breast cancer as well and is currently receiving chemotherapy. Pt also has family history of autoimmune diseases.       Pain Scales  Best: 3/10  Worst: 9/10  Usually: 4/10  Today: 3/10    Neck Pain      Mid-back Pain     Low-back Pain     Hip Pain      Knee Pain          Past Medical History:   Diagnosis Date    Allergy     Anxiety     Asthma     Boils     Back of left thigh, which is not  draining but still hard    Breast cancer     Bronchitis     CHF (congestive heart failure)     Depression     Herpes genitalis in women     Hx of psychiatric care     Wellbutrin, Remeron, Zoloft, Cymbalta, Ativan    Hypertension     Psychiatric problem     Sleep apnea     Sleep difficulties     Therapy     Thyroid disease        Past Surgical History:   Procedure Laterality Date    ADENOIDECTOMY      APPENDECTOMY      BREAST BIOPSY      BREAST LUMPECTOMY       SECTION      CHOLECYSTECTOMY      nodule removed frombuttocks - benign      SINUS SURGERY      TONSILLECTOMY      TRIGGER FINGER RELEASE         Review of patient's allergies indicates:   Allergen Reactions    Tessalon [benzonatate] Other (See Comments)     Throat closes up and jaw locks    Bactrim [sulfamethoxazole-trimethoprim] Hives    Codeine Hives    Colestipol Other (See Comments)     Chills, nausea, vomiting and severe shaking, fatigue and weakness    Cardizem [diltiazem hcl] Other (See Comments)    Percocet [oxycodone-acetaminophen] Hallucinations and Photosensitivity    Percodan [oxycodone hcl-oxycodone-asa] Anxiety    Wellbutrin [bupropion hcl] Itching       Current Outpatient Medications   Medication Sig Dispense Refill    acetaminophen (TYLENOL) 500 MG tablet Take 500 mg by mouth every 6 (six) hours as needed for Pain.      b complex vitamins tablet Take 1 tablet by mouth once daily.      B complex-liver extract Cap Take by mouth.      cloNIDine (CATAPRES) 0.1 MG tablet Take 0.1 mg by mouth every 6 (six) hours as needed.       clopidogrel (PLAVIX) 75 mg tablet Take 75 mg by mouth.      cyanocobalamin 2000 MCG tablet Take 2,000 mcg by mouth once daily.      DULoxetine (CYMBALTA) 30 MG capsule       DULoxetine (CYMBALTA) 60 MG capsule Take 60 mg by mouth.      esomeprazole (NEXIUM) 40 MG capsule       eszopiclone 3 mg Tab       exemestane (AROMASIN) 25 mg tablet Take 1 tablet (25 mg total) by mouth  once daily. 90 tablet 3    ezetimibe (ZETIA) 10 mg tablet       ferrous sulfate 325 (65 FE) MG EC tablet Take 100 mg by mouth.      furosemide (LASIX) 20 MG tablet Take 20 mg by mouth once daily.       isosorbide mononitrate (IMDUR) 60 MG 24 hr tablet       KLOR-CON M15 15 mEq tablet 15 mEq 2 (two) times daily.       levothyroxine (SYNTHROID) 150 MCG tablet Take 150 mcg by mouth.      LORazepam (ATIVAN) 0.5 MG tablet Take 0.5 mg by mouth.      metronidazole 1% (METROGEL) 1 % Gel Apply topically 2 (two) times daily.      montelukast (SINGULAIR) 10 mg tablet Take 10 mg by mouth.      nebivolol (BYSTOLIC) 5 MG Tab Take 5 mg by mouth once daily.       NEUPRO 4 mg/24 hour Place 1 patch onto the skin once daily.       nitroGLYCERIN (NITROSTAT) 0.4 MG SL tablet Place 0.4 mg under the tongue.      ondansetron (ZOFRAN-ODT) 8 MG TbDL Take 1 tablet (8 mg total) by mouth every 6 (six) hours as needed (nausea). (Patient taking differently: Take 4 mg by mouth every 6 (six) hours as needed (nausea). ) 30 tablet 1    potassium chloride (MICRO-K) 10 MEQ CpSR Take 10 mEq by mouth 2 (two) times daily.      RANEXA 1,000 mg Tb12 1,000 mg 2 (two) times daily.       spironolactone (ALDACTONE) 25 MG tablet Take 25 mg by mouth.      topiramate (TOPAMAX) 50 MG tablet       valacyclovir (VALTREX) 1000 MG tablet Take 1,000 mg by mouth 2 (two) times daily.      vitamin D 1000 units Tab Take 2,500 Units by mouth once daily.      amoxicillin (AMOXIL) 875 MG tablet Take 875 mg by mouth every 12 (twelve) hours.      eluxadoline (VIBERZI) 75 mg Tab Take 75 mg by mouth.      pantoprazole (PROTONIX) 40 MG tablet Take 40 mg by mouth once daily.       No current facility-administered medications for this visit.        Family History   Problem Relation Age of Onset    Breast cancer Maternal Aunt     Alcohol abuse Father        Social History     Socioeconomic History    Marital status:      Spouse name: Not on file     "Number of children: 2    Years of education: Not on file    Highest education level: Not on file   Social Needs    Financial resource strain: Not on file    Food insecurity - worry: Not on file    Food insecurity - inability: Not on file    Transportation needs - medical: Not on file    Transportation needs - non-medical: Not on file   Occupational History    Not on file   Tobacco Use    Smoking status: Never Smoker    Smokeless tobacco: Never Used   Substance and Sexual Activity    Alcohol use: No    Drug use: No    Sexual activity: Yes     Partners: Male   Other Topics Concern    Patient feels they ought to cut down on drinking/drug use Not Asked    Patient annoyed by others criticizing their drinking/drug use Not Asked    Patient has felt bad or guilty about drinking/drug use Not Asked    Patient has had a drink/used drugs as an eye opener in the AM Not Asked   Social History Narrative     (x4, 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   HENT: Negative.    Eyes: Negative.    Cardiovascular:        CHF   Respiratory:        Pulm HTN   Endocrine: Negative.    Skin: Negative.    Musculoskeletal: Positive for back pain, joint pain and neck pain.   Gastrointestinal: Negative.    Neurological: Negative.            Objective:   Ht 5' 4" (1.626 m)   Wt 111.4 kg (245 lb 9.5 oz)   BMI 42.16 kg/m²   Pain Disability Index Review:  Last 3 PDI Scores 11/13/2018   Pain Disability Index (PDI) 42     Normocephalic.  Atraumatic.  Affect appropriate.  Breathing unlabored.  Extra ocular muscles intact.  /68   Pulse 69   Temp 98.2 °F (36.8 °C)   Ht 5' 4" (1.626 m)   Wt 111.4 kg (245 lb 9.5 oz)   BMI 42.16 kg/m²     PHYSICAL EXAMINATION:  GEN:  Well developed, well nourished. Obese  No acute distress. Normal pain behavior.  HEENT:  No trauma.  Mucous membranes moist.  Nares patent bilaterally.  PSYCH: Normal affect. Thought content " appropriate.  CHEST:  Breathing symmetric.  No audible wheezing.  ABD: Soft, non-tender, non-distended.  SKIN:  Warm, pink, dry.  No rash on exposed areas.    EXT:  No cyanosis, clubbing, or edema.  No color change or changes in nail or hair growth.    NEURO/MUSCULOSKELETAL:  Fully alert, oriented, and appropriate. Speech normal meenakshi. No cranial nerve deficits.   Gait: Normal.   Strength: 5/5 motor strength throughout bilateral upper and lower extremities myotomes.   Sensory: No sensory deficit in the lower extremities dermatomes.   Reflexes:  2+ and symmetric throughout.  Downgoing Babinski's bilaterally, negative corbett's.  No clonus or spasticity   L-Spine:  Normal ROM without pain. + facet loading bilaterally.  - SLR bilaterally.    No TTP over lumbar paraspinals  SI Joint/Hip: - FROYLAN bilaterally.  - FADIR bilaterally.  TTP over right SI joints, right GTB.    C-Spine: normal ROM without pain. -Spurling bilaterally.      Previous imaging was reviewed and discussed with the patient today.         Ortho/SPM Exam      Assessment:       Encounter Diagnoses   Name Primary?    Trochanteric bursitis of right hip Yes    DDD (degenerative disc disease), lumbar     Spondylolysis of lumbar region     Arthralgia, unspecified joint     History of cancer of left breast     Bilateral hand numbness     Abnormal x-ray of cervical spine          Plan:   We discussed with the patient the assessment and recommendations. The following is the plan we agreed on:  1. We will order labs and xrays to rule out suspected polymyalgia rheumatica since pt has history of cancer and family history of autoimmune disease.  Labs: CBC, CMP, CRP, Sed rate  Xrays: arthrits panel.  2. If labs are positive, we will refer to rheumatology.  3. If labs are negative, we will schedule appt for right GTB, right ITB and trigger point injections.  4. Will order cervical MRI as pt has history of breast cancer and is now with new onset cervical pain  with associated bilateral hand numbness.    Pt seen and discussed with Dr. Allen who agrees with the above.    Parul Hermosillo, DO    I have personally taken the history and examined this patient and agree with the fellow's note as stated above.

## 2018-11-13 NOTE — LETTER
November 13, 2018      Aden Rios MD  [address]  [address]  [facility] [address]           [facility]  [address]  [facility] [address]  Phone: [phone]  Fax: [phone]          Patient: Faby Walker   MR Number: [id number]   YOB: 1955   Date of Visit: 11/13/2018       Dear Dr. Aden Rios:    Thank you for referring Faby Walker to me for evaluation. Attached you will find relevant portions of my assessment and plan of care.    If you have questions, please do not hesitate to call me. I look forward to following Faby Walker along with you.    Sincerely,    Sultana Allen MD    Enclosure  CC:  No Recipients    If you would like to receive this communication electronically, please contact [email or web address] or [phone] to request more information on [employer] access.    For providers and/or their staff who would like to refer a patient to [facility], please contact us through our one-stop-shop provider referral line, [facility], [place], at [phone].    If you feel you have received this communication in error or would no longer like to receive these types of communications, please e-mail [email or web address]

## 2018-11-13 NOTE — PROGRESS NOTES
Subjective:      Patient ID: Faby Walker is a 62 y.o. female.     Chief Complaint: follow up for breast cancer     Diagnosis:  Stage IA ([id number]) multifocal invasive ductal carcinoma of the left breast diagnosed on 2014, ER/FL+, HER2 neg.      Treatment history:   1. Core needle biopsy of left breast lesion on 14 showed invasive ductal carcinoma, grade 2, ER positive (78.9%), FL positive (79.7%), HER2 negative (0 by IHC). Ki-67 22.4%.    2. Ms. Walker underwent left lumpectomy + SLND on 2014. Pathology showed invasive ductal carcinoma, multifocal, largest 0.55 cm, grade 2, a second one 0.2 cm, a third one 0.1 cm, negative margin. DCIS (+margin). Four sentinel lymph nodes were removed and negative for malignancy.   3. Radiation of 61.2Gy to left breast 14-14.  4. Endocrine therapy with tamoxifen, letrozole and anastrozole, stopped due to severe myalgias  5. Most recently mammogram on 18 was negative  6. Started on exemestane after transferring her care to me in 2017.   7. DEXA scan on 8/15/2017 showed T score of lumbar spine -1.4, consistent with osteopenia and increased risk of fracture.      History of Present Illness:   Ms. Walker is a very pleasant 61 yo  woman with HTN, GERD, asthma, history of early stage left breast cancer, who presents today for follow up. She has been taking exemestane since 2017 and has tolerated it very well. She continues to have pain and itching in her left breast. Has put some moisturizing cream over the breast. Last received zometa on 18. Had a tooth that got decayed and pulled in 2018. Seen by pain management today for joint pain, was felt to have bursitis but need to r/o autoimmune disease. +depression     ECO     ROS:   A ten-point system review is obtained and negative except for what was stated in the HPI.      Physical Examination:   Vital signs reviewed.   Gen: well hydrated, well developed, in no acute  distress.  HEENT: normocephalic, anicteric sclerae, PERRLA, EOMI, oropharynx clear  Neck: supple, no JVD, thyromegaly, cervical or supraclavicular LAD  Lungs: CTAB, no wheezes or rales  Heart: RRR, no M/R/G  Breasts: L: mild petechiae and erythema, no masses or axillary LAD. R: no abnormal skin changes, masses or axillary LAD  Abdomen: soft, no tenderness, non-distended, no hepatosplenomegaly, mass, or hernia. BS present  Ext: no clubbing, cyanosis, or edema  Neuro: alert and oriented x 4, no focal neuro deficit  Skin: no rash, erythema, open wound or ulcers  Psych: pleasant and appropriate mood and affect        Objective:      Diagnostic Tests:  Mammogram on 5/14/18 normal     DEXA scan on 8/15/2017: T score of lumbar spine -1.4, consistent with osteopenia and increased risk of fracture.            Assessment/Plan:      1. Malignant neoplasm of overlapping sites of left breast in female, estrogen receptor positive    2. Breast pain, left    3. Erythema    4. Cancer treatment induced bone loss    5. Osteopenia, unspecified location    6. Moderate episode of recurrent major depressive disorder        1-3  - continues to have itching. Mild erythema on the breast today. Will repeat a mammogram of the left breast  - ask patient to apply OTC hydrocortisone cream  - c/w exemestane     4.5.   - #2 zometa today  - return in 6 months for #3     6.  - denies SI/HI  - She will call Dr. Graf's office today to schedule follow up appointment

## 2018-11-14 ENCOUNTER — HOSPITAL ENCOUNTER (OUTPATIENT)
Dept: RADIOLOGY | Facility: OTHER | Age: 63
Discharge: HOME | End: 2018-11-14
Attending: ANESTHESIOLOGY
Payer: COMMERCIAL

## 2018-11-14 ENCOUNTER — PROCEDURE VISIT (OUTPATIENT)
Dept: PAIN MEDICINE | Facility: CLINIC | Age: 63
End: 2018-11-14
Attending: ANESTHESIOLOGY
Payer: COMMERCIAL

## 2018-11-14 ENCOUNTER — PATIENT MESSAGE (OUTPATIENT)
Dept: PAIN MEDICINE | Facility: CLINIC | Age: 63
End: 2018-11-14

## 2018-11-14 VITALS
HEIGHT: 64 IN | RESPIRATION RATE: 18 BRPM | DIASTOLIC BLOOD PRESSURE: 80 MMHG | HEART RATE: 74 BPM | TEMPERATURE: 97 F | WEIGHT: 244.69 LBS | SYSTOLIC BLOOD PRESSURE: 118 MMHG | BODY MASS INDEX: 41.77 KG/M2

## 2018-11-14 DIAGNOSIS — R20.0 BILATERAL HAND NUMBNESS: ICD-10-CM

## 2018-11-14 DIAGNOSIS — M50.30 DDD (DEGENERATIVE DISC DISEASE), CERVICAL: Primary | ICD-10-CM

## 2018-11-14 DIAGNOSIS — M76.30 TENDINITIS OF ILIOTIBIAL BAND, UNSPECIFIED LATERALITY: ICD-10-CM

## 2018-11-14 DIAGNOSIS — Z85.3 HISTORY OF CANCER OF LEFT BREAST: ICD-10-CM

## 2018-11-14 DIAGNOSIS — R93.7 ABNORMAL X-RAY OF CERVICAL SPINE: ICD-10-CM

## 2018-11-14 DIAGNOSIS — M70.60 TROCHANTERIC BURSITIS, UNSPECIFIED LATERALITY: Primary | ICD-10-CM

## 2018-11-14 PROCEDURE — 20611 DRAIN/INJ JOINT/BURSA W/US: CPT | Mod: 50,S$GLB,, | Performed by: ANESTHESIOLOGY

## 2018-11-14 PROCEDURE — 72156 MRI NECK SPINE W/O & W/DYE: CPT | Mod: TC

## 2018-11-14 PROCEDURE — 25500020 PHARM REV CODE 255: Performed by: ANESTHESIOLOGY

## 2018-11-14 PROCEDURE — 72156 MRI NECK SPINE W/O & W/DYE: CPT | Mod: 26,,, | Performed by: RADIOLOGY

## 2018-11-14 PROCEDURE — A9585 GADOBUTROL INJECTION: HCPCS | Performed by: ANESTHESIOLOGY

## 2018-11-14 RX ORDER — BETAMETHASONE SODIUM PHOSPHATE AND BETAMETHASONE ACETATE 3; 3 MG/ML; MG/ML
6 INJECTION, SUSPENSION INTRA-ARTICULAR; INTRALESIONAL; INTRAMUSCULAR; SOFT TISSUE
Status: COMPLETED | OUTPATIENT
Start: 2018-11-14 | End: 2018-11-14

## 2018-11-14 RX ORDER — GADOBUTROL 604.72 MG/ML
10 INJECTION INTRAVENOUS
Status: COMPLETED | OUTPATIENT
Start: 2018-11-14 | End: 2018-11-14

## 2018-11-14 RX ADMIN — GADOBUTROL 10 ML: 604.72 INJECTION INTRAVENOUS at 11:11

## 2018-11-14 RX ADMIN — BETAMETHASONE SODIUM PHOSPHATE AND BETAMETHASONE ACETATE 6 MG: 3; 3 INJECTION, SUSPENSION INTRA-ARTICULAR; INTRALESIONAL; INTRAMUSCULAR; SOFT TISSUE at 12:11

## 2018-11-14 NOTE — PROCEDURES
Ischial Tuberosity injection  Time-out taken to identify patient and procedure side prior to starting the procedure.   I attest that I have reviewed the patient's home medications prior to the procedure and no contraindication have been identified. I  re-evaluated the patient after the patient was positioned for the procedure in the procedure room immediately before the procedural time-out. The vital signs are current and represent the current state of the patient which has not significantly changed since the preprocedure assessment.                                                                                                                      Date of Service: 11/14/2018    PCP: Gato Howard MD    Referring Physician:                                                                                                   PROCEDURE:  Bl greater trochanterl bursa  injection under ultrasound  REASON FOR PROCEDURE: BL greater trochanteric bursitis  POSTPROCEDURE DIAGNOSIS:  Same  PHYSICIAN: Sultana Allen MD  ASSISTANTS: HALEY Cordon MD                                                                                           ANESTHESIA:  Xylocaine 1% 9ml with Sodium Bicarbonate 1ml. 3ml per site.                                                                                                              MEDICATIONS INJECTED:  Kenalog 20mg, bupivacaine 0.25% 4 mL (per side), and sterile saline 4ml (per side)                                                                                                                                     ESTIMATED BLOOD LOSS:  None.                                                                                                                              COMPLICATIONS:  None.                                                                                                                                     TECHNIQUE:  With the patient lying in the prone position the same side  greater    trochanter was palpated.  The area was prepped and draped in the usual       sterile fashion.  Local anesthetic was used, given by raising a wheal and    going down to the hub of the 27-gauge needle.  A 3-1/2 inch 22-gauge         needle was introduced under ultrasound until the tip reached the greater    trochanter.  The tip of the needle was hinged cephalad from the greater        trochanter into the joint space.  When the tip of the needle was shown  to be in appropriate position, medication was then injected slowly.  The same procedure was then repeated on the contralateral side. The patient tolerated the procedure well.                                                                                                                     PAIN BEFORE THE PROCEDURE: 4/10.                                                                                                                         PAIN AFTER THE PROCEDURE: 0/10.                                                                                                                          The patient was monitored for 20-30 minutes after the procedure and was      given post procedure and discharge instructions to follow at home.  The      patient is to follow-up with me in two weeks by calling.   The patient was discharged in a stable condtion.  I have personally taken the history and examined this patient and agree with the resident's note as stated above.

## 2018-11-29 ENCOUNTER — TELEPHONE (OUTPATIENT)
Dept: PAIN MEDICINE | Facility: CLINIC | Age: 63
End: 2018-11-29

## 2018-11-29 NOTE — TELEPHONE ENCOUNTER
----- Message from Dinora Shannon sent at 11/29/2018  4:28 PM CST -----  Contact: Self            Name of Who is Calling: FRANKYPOORNIMA ADAMS [[id number]]      What is the request in detail: Pt states she is having an Injection procedure on 12/6 and pt needs to know how long should she hold the Plavix. Please contact to further discuss and advise.         Can the clinic reply by [facility]: N      What Number to Call Back if not in [facility]: [phone]

## 2018-11-30 ENCOUNTER — TELEPHONE (OUTPATIENT)
Dept: ADMINISTRATIVE | Facility: OTHER | Age: 63
End: 2018-11-30

## 2018-11-30 NOTE — TELEPHONE ENCOUNTER
Contacted and spoke with pt regarding location of procedure . Pt was give instructions to the pain management procedure area on the second floor of the [facility].    Pt verbalized understanding

## 2018-11-30 NOTE — TELEPHONE ENCOUNTER
----- Message from Dinora Shannon sent at 11/30/2018  9:44 AM CST -----  Contact: Self            Name of Who is Calling: FRANKYPOORNIMA ADAMS [[id number]]      What is the request in detail: Pt states she needs to know where her injection will be. Please contact to further discuss and advise.        Can the clinic reply by [facility]: N      What Number [facility] Back [facility]: [phone]

## 2018-12-10 ENCOUNTER — HOSPITAL ENCOUNTER (OUTPATIENT)
Facility: OTHER | Age: 63
Discharge: HOME | End: 2018-12-10
Attending: ANESTHESIOLOGY | Admitting: ANESTHESIOLOGY
Payer: COMMERCIAL

## 2018-12-10 VITALS
DIASTOLIC BLOOD PRESSURE: 68 MMHG | HEIGHT: 64 IN | TEMPERATURE: 98 F | OXYGEN SATURATION: 95 % | WEIGHT: 245 LBS | RESPIRATION RATE: 18 BRPM | BODY MASS INDEX: 41.83 KG/M2 | HEART RATE: 60 BPM | SYSTOLIC BLOOD PRESSURE: 136 MMHG

## 2018-12-10 DIAGNOSIS — M54.12 CERVICAL RADICULOPATHY: Primary | ICD-10-CM

## 2018-12-10 PROCEDURE — 25500020 PHARM REV CODE 255: Performed by: ANESTHESIOLOGY

## 2018-12-10 PROCEDURE — 62321 NJX INTERLAMINAR CRV/THRC: CPT | Mod: ,,, | Performed by: ANESTHESIOLOGY

## 2018-12-10 PROCEDURE — 25000003 PHARM REV CODE 250: Performed by: PHYSICAL MEDICINE & REHABILITATION

## 2018-12-10 PROCEDURE — 62321 NJX INTERLAMINAR CRV/THRC: CPT | Performed by: ANESTHESIOLOGY

## 2018-12-10 PROCEDURE — 99152 MOD SED SAME PHYS/QHP 5/>YRS: CPT | Mod: ,,, | Performed by: ANESTHESIOLOGY

## 2018-12-10 PROCEDURE — 25000003 PHARM REV CODE 250: Performed by: ANESTHESIOLOGY

## 2018-12-10 PROCEDURE — 63600175 PHARM REV CODE 636 W HCPCS: Performed by: ANESTHESIOLOGY

## 2018-12-10 RX ORDER — FENTANYL CITRATE 50 UG/ML
INJECTION, SOLUTION INTRAMUSCULAR; INTRAVENOUS
Status: DISCONTINUED | OUTPATIENT
Start: 2018-12-10 | End: 2018-12-10 | Stop reason: HOSPADM

## 2018-12-10 RX ORDER — LIDOCAINE HYDROCHLORIDE 10 MG/ML
INJECTION, SOLUTION EPIDURAL; INFILTRATION; INTRACAUDAL; PERINEURAL
Status: DISCONTINUED | OUTPATIENT
Start: 2018-12-10 | End: 2018-12-10 | Stop reason: HOSPADM

## 2018-12-10 RX ORDER — LIDOCAINE HYDROCHLORIDE 10 MG/ML
INJECTION INFILTRATION; PERINEURAL
Status: DISCONTINUED | OUTPATIENT
Start: 2018-12-10 | End: 2018-12-10 | Stop reason: HOSPADM

## 2018-12-10 RX ORDER — DEXAMETHASONE SODIUM PHOSPHATE 100 MG/10ML
INJECTION INTRAMUSCULAR; INTRAVENOUS
Status: DISCONTINUED | OUTPATIENT
Start: 2018-12-10 | End: 2018-12-10 | Stop reason: HOSPADM

## 2018-12-10 RX ORDER — SODIUM CHLORIDE 9 MG/ML
500 INJECTION, SOLUTION INTRAVENOUS CONTINUOUS
Status: DISCONTINUED | OUTPATIENT
Start: 2018-12-10 | End: 2018-12-10 | Stop reason: HOSPADM

## 2018-12-10 RX ORDER — MIDAZOLAM HYDROCHLORIDE 1 MG/ML
INJECTION INTRAMUSCULAR; INTRAVENOUS
Status: DISCONTINUED | OUTPATIENT
Start: 2018-12-10 | End: 2018-12-10 | Stop reason: HOSPADM

## 2018-12-10 NOTE — OP NOTE
Cervical Interlaminar Epidural Steroid Injection under fluoroscopic guidance.  Time-out taken to identify patient and procedure side prior to starting the procedure.   I attest that I have reviewed the patient's home medications prior to the procedure and no contraindication have been identified. I  re-evaluated the patient after the patient was positioned for the procedure in the procedure room immediately before the procedural time-out. The vital signs are current and represent the current state of the patient which has not significantly changed since the preprocedure assessment.                                                              Date of Service: 12/10/2018    PCP: Gato Howard MD    Procedure: C7-T1 cervical interlaminar epidural steroid injection under fluoroscopy.  Reasons for procedure: DDD (degenerative disc disease), cervical [M50.30]  1. Cervical radiculopathy      POSTOP DIAGNOSIS:  DDD (degenerative disc disease), cervical [M50.30]     1. Cervical radiculopathy      Physician: Sultana Allen MD  ASSISTANTS: Eric Paul MD    Medications injected:  Preservative-free dexamethasone 10mg and Xylocaine 1% MPF 1ml.  This was followed by a slow injection of 4 mL sterile, preservative-free normal saline.    Local anesthetic used: Xylocaine 1% 9ml with Sodium Bicarbonate 1ml.     Sedation Medications: Versed 2mg IV, Fentanyl 50mcg IV    Complications:  none.    Estimated blood loss: none.    Technique:  With the patient laying in a prone position with the neck in a flexed forward position, the area was prepped and draped in the usual sterile fashion using ChloraPrep and a fenestrated drape.  The area was determined under fluoroscopic guidance.  Local anesthetic was given using a 27-gauge needle by raising a wheal and going down to the osseous elements of the cervical spine.  A 3.5 inch 20-gauge Touhy needle was introduced under fluoroscopic guidance to meet the lamina of T1.  The needle was then  hinged under the lamina then advanced using loss of resistance technique.  Once the tip of the needle was in the desired position, the contrast dye Omnipaque was injected to determine placement and no vascular runoff.  Digital subtraction was employed to confirm that there is no vascular runoff.  The steroid was then injected slowly followed by a slow injection of the 4 mL of the sterile preservative-free normal saline.  The patient tolerated the procedure well.    Pain before the procedure: 4/10    Pain after the procedure: 0/10    The patient was monitored after the procedure and was given post-procedure and discharge instructions to follow at home. The patient was discharged in a stable condition

## 2018-12-10 NOTE — DISCHARGE INSTRUCTIONS
Thank you for allowing us to care for you today. You may receive a survey about the care we provided. Your feedback is valuable and helps us provide excellent care throughout the community.     Home Care Instructions for Pain Management:    1. DIET:   You may resume your normal diet today.   2. BATHING:   You may shower with luke warm water. No tub baths or anything that will soak injection sites under water for the next 24 hours.  3. DRESSING:   You may remove your bandage today.   4. ACTIVITY LEVEL:   You may resume your normal activities 24 hrs after your procedure. Nothing strenuous today.  5. MEDICATIONS:   You may resume your normal medications today. Restart Plavix TOMORROW  6. DRIVING    If you have received any sedatives by mouth today, you may not drive for 12 hours.    If you have received any sedation through your IV, you may not drive for 24 hrs.   7. SPECIAL INSTRUCTIONS:   No heat to the injection site for 24 hrs including, hot bath or shower, heating pad, moist heat, or hot tubs.    Use ice pack to injection site for any pain or discomfort.  Apply ice packs for 20 minute intervals as needed.    IF you have diabetes, be sure to monitor your blood sugar more closely. IF your injection contained steroids your blood sugar levels may become higher than normal.    If you are still having pain upon discharge:  Your pain may improve over the next 48 hours. The anesthetic (numbing medication) works immediately to 48 hours. IF your injection contained a steroid (anti-inflammatory medication), it takes approximately 3 days to start feeling relief and 7-10 days to see your greatest results from the medication. It is possible you may need subsequent injections. This would be discussed at your follow up appointment with pain management or your referring doctor.      PLEASE CALL YOUR DOCTOR IF:  1. Redness or swelling around the injection site.  2. Fever of 101 degrees or more  3. Drainage (pus) from the  injection site.  4. For any continuous bleeding (some dried blood over the incision is normal.)    FOR EMERGENCIES:   If any unusual problems or difficulties occur during clinic hours, call [phone] or [phone].

## 2018-12-10 NOTE — PLAN OF CARE
Problem: Adult Inpatient Plan of Care  Goal: Plan of Care Review  Pt tolerated procedure well. Pt reports 2/10 pain after procedure. Assisted pt up for first time. Steady on feet. All discharge instructions given.

## 2018-12-10 NOTE — DISCHARGE SUMMARY
Discharge Diagnosis:DDD (degenerative disc disease), cervical [M50.30]  Condition on Discharge: Stable.  Diet on Discharge: Same as before.  Activity: as per instruction sheet.  Discharge to: Home with a responsible adult.  Follow up: 2-4 weeks &/or as per Discharge instructions

## 2018-12-17 ENCOUNTER — HISTORICAL (OUTPATIENT)
Dept: ADMINISTRATIVE | Facility: HOSPITAL | Age: 63
End: 2018-12-17

## 2018-12-17 LAB — MAGNESIUM SERPL-MCNC: 2.2 MG/DL (ref 1.8–2.4)

## 2018-12-25 ENCOUNTER — NURSE TRIAGE (OUTPATIENT)
Dept: ADMINISTRATIVE | Facility: CLINIC | Age: 63
End: 2018-12-25

## 2018-12-26 NOTE — TELEPHONE ENCOUNTER
Reason for Disposition   Question about upcoming scheduled test, no triage required and triager able to answer question    Protocols used: ST INFORMATION ONLY CALL-A-  pt called re appt 12/26 at 1040 with pain mgmt

## 2018-12-29 ENCOUNTER — HOSPITAL ENCOUNTER (EMERGENCY)
Facility: HOSPITAL | Age: 63
Discharge: HOME | End: 2018-12-29
Attending: EMERGENCY MEDICINE
Payer: COMMERCIAL

## 2018-12-29 VITALS
DIASTOLIC BLOOD PRESSURE: 87 MMHG | HEIGHT: 64 IN | RESPIRATION RATE: 18 BRPM | TEMPERATURE: 98 F | BODY MASS INDEX: 41.83 KG/M2 | OXYGEN SATURATION: 98 % | WEIGHT: 245 LBS | HEART RATE: 68 BPM | SYSTOLIC BLOOD PRESSURE: 132 MMHG

## 2018-12-29 DIAGNOSIS — J40 BRONCHITIS: ICD-10-CM

## 2018-12-29 DIAGNOSIS — J06.9 URI WITH COUGH AND CONGESTION: Primary | ICD-10-CM

## 2018-12-29 PROCEDURE — 25000003 PHARM REV CODE 250: Performed by: PHYSICIAN ASSISTANT

## 2018-12-29 PROCEDURE — 94761 N-INVAS EAR/PLS OXIMETRY MLT: CPT

## 2018-12-29 PROCEDURE — 99284 EMERGENCY DEPT VISIT MOD MDM: CPT | Mod: ,,, | Performed by: PHYSICIAN ASSISTANT

## 2018-12-29 PROCEDURE — 25000242 PHARM REV CODE 250 ALT 637 W/ HCPCS: Performed by: PHYSICIAN ASSISTANT

## 2018-12-29 PROCEDURE — 94640 AIRWAY INHALATION TREATMENT: CPT

## 2018-12-29 PROCEDURE — 99284 EMERGENCY DEPT VISIT MOD MDM: CPT | Mod: 25

## 2018-12-29 PROCEDURE — 99284 PR EMERGENCY DEPT VISIT,LEVEL IV: ICD-10-PCS | Mod: ,,, | Performed by: PHYSICIAN ASSISTANT

## 2018-12-29 RX ORDER — AZITHROMYCIN 250 MG/1
TABLET, FILM COATED ORAL
Qty: 6 TABLET | Refills: 0 | Status: SHIPPED | OUTPATIENT
Start: 2018-12-29 | End: 2019-06-27

## 2018-12-29 RX ORDER — ALBUTEROL SULFATE 90 UG/1
1-2 AEROSOL, METERED RESPIRATORY (INHALATION) EVERY 6 HOURS PRN
Qty: 1 INHALER | Refills: 0 | Status: SHIPPED | OUTPATIENT
Start: 2018-12-29 | End: 2019-12-29

## 2018-12-29 RX ORDER — IPRATROPIUM BROMIDE AND ALBUTEROL SULFATE 2.5; .5 MG/3ML; MG/3ML
3 SOLUTION RESPIRATORY (INHALATION)
Status: COMPLETED | OUTPATIENT
Start: 2018-12-29 | End: 2018-12-29

## 2018-12-29 RX ORDER — ESOMEPRAZOLE MAGNESIUM 40 MG/1
40 CAPSULE, DELAYED RELEASE ORAL
COMMUNITY

## 2018-12-29 RX ORDER — ONDANSETRON 4 MG/1
4 TABLET, ORALLY DISINTEGRATING ORAL
Status: COMPLETED | OUTPATIENT
Start: 2018-12-29 | End: 2018-12-29

## 2018-12-29 RX ORDER — ACETAMINOPHEN 325 MG/1
650 TABLET ORAL
Status: COMPLETED | OUTPATIENT
Start: 2018-12-29 | End: 2018-12-29

## 2018-12-29 RX ORDER — PROMETHAZINE HYDROCHLORIDE AND DEXTROMETHORPHAN HYDROBROMIDE 6.25; 15 MG/5ML; MG/5ML
5 SYRUP ORAL EVERY 6 HOURS PRN
Qty: 80 ML | Refills: 0 | Status: SHIPPED | OUTPATIENT
Start: 2018-12-29 | End: 2019-01-08

## 2018-12-29 RX ORDER — PREDNISONE 20 MG/1
20 TABLET ORAL DAILY
Qty: 3 TABLET | Refills: 0 | Status: SHIPPED | OUTPATIENT
Start: 2018-12-29 | End: 2019-01-01

## 2018-12-29 RX ORDER — ALBUTEROL SULFATE 90 UG/1
2 AEROSOL, METERED RESPIRATORY (INHALATION) ONCE
Status: COMPLETED | OUTPATIENT
Start: 2018-12-29 | End: 2018-12-29

## 2018-12-29 RX ADMIN — IPRATROPIUM BROMIDE AND ALBUTEROL SULFATE 3 ML: .5; 3 SOLUTION RESPIRATORY (INHALATION) at 08:12

## 2018-12-29 RX ADMIN — ALBUTEROL SULFATE 2 PUFF: 90 AEROSOL, METERED RESPIRATORY (INHALATION) at 09:12

## 2018-12-29 RX ADMIN — ONDANSETRON 4 MG: 4 TABLET, ORALLY DISINTEGRATING ORAL at 08:12

## 2018-12-29 RX ADMIN — ACETAMINOPHEN 650 MG: 325 TABLET ORAL at 08:12

## 2018-12-29 NOTE — ED PROVIDER NOTES
Encounter Date: 12/29/2018       History     Chief Complaint   Patient presents with    URI     i think i have bronchitis for 3-4 d,      62 y/o female with history of Pulmonary HTN, presents with chief complain of cough and malaise x 4 days.  She has cough productive of thick yellow mucous per the patient and has wheezing since last night.  She denies fever or chills.  She has been staying overnight in the hospital with her  for the last 3 days.  She is not taking any over the counter medications or antipyretics.  She has a mild headache and body aches.  She has nasal congestion, chest congestion, but she denies chest pain.  She reports shortness of breath for the last few days.  She denies increased leg swelling.    She had a flu shot this year.            Review of patient's allergies indicates:   Allergen Reactions    Tessalon [benzonatate] Other (See Comments)     Throat closes up and jaw locks    Bactrim [sulfamethoxazole-trimethoprim] Hives    Codeine Hives    Colestipol Other (See Comments)     Chills, nausea, vomiting and severe shaking, fatigue and weakness    Cardizem [diltiazem hcl] Other (See Comments)    Sulfa (sulfonamide antibiotics) Hives    Percocet [oxycodone-acetaminophen] Hallucinations and Photosensitivity    Percodan [oxycodone hcl-oxycodone-asa] Anxiety    Wellbutrin [bupropion hcl] Itching     Past Medical History:   Diagnosis Date    Allergy     Anxiety     Asthma     Boils     Back of left thigh, which is not draining but still hard    Breast cancer     Bronchitis     Bursitis     Cervical radiculopathy     CHF (congestive heart failure)     Depression     Herpes genitalis in women     Hx of psychiatric care     Wellbutrin, Remeron, Zoloft, Cymbalta, Ativan    Hypertension     Psychiatric problem     Sleep apnea     Sleep difficulties     Therapy     Thyroid disease      Past Surgical History:   Procedure Laterality Date    ADENOIDECTOMY       APPENDECTOMY      AUGMENTATION OF BREAST      BREAST BIOPSY      BREAST LUMPECTOMY       SECTION      CHOLECYSTECTOMY      Injection, Steroid, Epidural CERVICAL C7-T1 JAMIE N/A 12/10/2018    Performed [facility]ultana [facility] at [facility] PAIN MGT    nodule removed frombuttocks - benign      SINUS SURGERY      TONSILLECTOMY      TRIGGER FINGER RELEASE       Family History   Problem Relation Age of Onset    Breast cancer Maternal Aunt     Alcohol abuse Father      Social History     Tobacco Use    Smoking status: Never Smoker    Smokeless tobacco: Never Used   Substance Use Topics    Alcohol use: No    Drug use: No     Review of Systems   Constitutional: Negative for chills and fever.   HENT: Positive for postnasal drip and rhinorrhea. Negative for sore throat.    Respiratory: Positive for cough, shortness of breath and wheezing.    Cardiovascular: Negative for chest pain.   Gastrointestinal: Positive for nausea. Negative for abdominal pain and vomiting.   Genitourinary: Negative for dysuria.   Musculoskeletal: Positive for myalgias. Negative for back pain.   Skin: Negative for rash.   Neurological: Positive for headaches. Negative for dizziness, weakness and light-headedness.   Hematological: Does not bruise/bleed easily.   Psychiatric/Behavioral: Negative for confusion.       Physical Exam     Initial Vitals [18 0750]   BP Pulse Resp Temp SpO2   132/87 73 18 98.4 °F (36.9 °C) 97 %      MAP       --         Physical Exam    Nursing note and vitals reviewed.  Constitutional: She appears well-developed and well-nourished.   HENT:   Head: Atraumatic.   Nose: No sinus tenderness.   Mouth/Throat: Oropharynx is clear and moist. No oropharyngeal exudate, posterior oropharyngeal edema or posterior oropharyngeal erythema.   Eyes: Conjunctivae and EOM are normal. Pupils are equal, round, and reactive to light.   Neck: Normal range of motion. Neck supple.   Cardiovascular: Normal rate and regular rhythm.    Pulmonary/Chest: No respiratory distress. She has wheezes (right upper lung field ). She has no rales.   Abdominal: Soft. Bowel sounds are normal. There is no tenderness.   Neurological: She is alert and oriented to person, place, and time.   Skin: No rash noted.   Psychiatric: She has a normal mood and affect.         ED Course   Procedures  Labs Reviewed - No data to display       Imaging Results          X-Ray Chest PA And Lateral (Final result)  Result time 12/29/18 08:37:26    Final result by Arlin Salas MD (12/29/18 08:37:26)                 Impression:      Normal exam.      Electronically signed by: Arlin Salas MD  Date:    12/29/2018  Time:    08:37             Narrative:    EXAMINATION:  XR CHEST PA AND LATERAL    CLINICAL HISTORY:  productive cough;    TECHNIQUE:  PA and lateral views of the chest were performed.    COMPARISON:  None    FINDINGS:  There is platelike atelectasis versus scar in the left lower lobe.  Otherwise,the lungs are clear, with normal appearance of pulmonary vasculature and no pleural effusion or pneumothorax.    The cardiac silhouette is normal in size. The hilar and mediastinal contours are unremarkable.    The osseous and soft tissue structures are normal.                                       APC / Resident Notes:   The patient appears to have viral URI/Bronchitis.  She has mild wheezing right upper lung field.   Will give duo nebs, tylenol, zofran, order chest xray and reassess.      Based upon the history and physical exam the patient does not appear to have a serious bacterial infection such as otitis media, bacterial sinusitis, strep pharyngitis, parapharyngeal or peritonsillar abscess, meningitis. Chest xray is negative for pneumonia or other acute process.   Patient appears well and feels improved after duo neb treatments.    Patient reports having bronchitis 1 year ago, but says her symptoms were much more severe at that time.   Will prescribe albuterol,  prednisone x 3 days and cough syrup for treatment of bronchitis. Will give Z pac, but have advised that patient hold this antibiotic as this is likely viral and she will not need this antibiotic if symptoms begin improving with treatment started today.     I have given specific return precautions to the patient and advised f/u with PCP within 1 week for ER follow up exam.        I discussed the care of this pt with my supervising MD.             Attending Attestation:     Physician Attestation Statement for NP/PA:   I discussed this assessment and plan of this patient with the NP/PA, but I did not personally examine the patient. The face to face encounter was performed by the NP/PA.    Other NP/PA Attestation Additions:    History of Present Illness: 62 y/o f complains of cough and feeling like she has bronchitis.    Medical Decision Making: I have independently interpreted CXR, no infiltrate.  Given steroids, dextromethorphan, nebs.                    Clinical Impression:   The primary encounter diagnosis was URI with cough and congestion. A diagnosis of Bronchitis was also pertinent to this visit.                             COLE Quiroz  12/29/18 [id number]

## 2018-12-29 NOTE — ED NOTES
Pt was shown how to use spacer with inhaler. Pt showed demonstration back and stated she didn't have any questions.

## 2018-12-29 NOTE — ED NOTES
Patient identifiers verified and correct for Ms Denise  C/C: Cough  APPEARANCE: awake and alert in NAD.  SKIN: warm, dry and intact. No breakdown or bruising.  MUSCULOSKELETAL: Patient moving all extremities spontaneously, no obvious swelling or deformities noted. Ambulates independently.  RESPIRATORY: Positive shortness of breath, more with exertion.Respirations unlabored. Positive cough with yellow sputum, positive chills, Breath Sounds decr, no wheezes  CARDIAC: Denies CP, 2+ distal pulses; no peripheral edema  ABDOMEN: S/ND/NT, Denies nausea  : voids spontaneously, denies difficulty  Neurologic: AAO x 4; follows commands equal strength in all extremities; denies numbness/tingling. Denies dizziness Positive gen weakness

## 2019-01-07 DIAGNOSIS — C50.919 MALIGNANT NEOPLASM OF BREAST IN FEMALE, ESTROGEN RECEPTOR POSITIVE, UNSPECIFIED LATERALITY, UNSPECIFIED SITE OF BREAST: ICD-10-CM

## 2019-01-07 DIAGNOSIS — Z17.0 MALIGNANT NEOPLASM OF BREAST IN FEMALE, ESTROGEN RECEPTOR POSITIVE, UNSPECIFIED LATERALITY, UNSPECIFIED SITE OF BREAST: ICD-10-CM

## 2019-01-07 RX ORDER — EXEMESTANE 25 MG/1
25 TABLET ORAL DAILY
Qty: 90 TABLET | Refills: 3 | Status: SHIPPED | OUTPATIENT
Start: 2019-01-07

## 2019-01-07 NOTE — TELEPHONE ENCOUNTER
----- Message from Agatha Mcintosh sent at 1/7/2019 11:56 AM CST -----  Contact: Rocío stacy/[facility]   Can the clinic reply in [facility]: No     Please refill the medication(s) listed below. Please call the patient when the prescription(s) is ready for  at the phone number [phone]    Medication #1: exemestane (AROMASIN) 25 mg tablet    Medication #2:    Preferred Pharmacy: [facility]

## 2019-01-22 ENCOUNTER — OFFICE VISIT (OUTPATIENT)
Dept: PSYCHIATRY | Facility: CLINIC | Age: 64
End: 2019-01-22
Payer: COMMERCIAL

## 2019-01-22 DIAGNOSIS — F41.1 GENERALIZED ANXIETY DISORDER: Primary | ICD-10-CM

## 2019-01-22 DIAGNOSIS — F33.1 MODERATE EPISODE OF RECURRENT MAJOR DEPRESSIVE DISORDER: ICD-10-CM

## 2019-01-22 PROCEDURE — 99999 PR PBB SHADOW E&M-EST. PATIENT-LVL II: CPT | Mod: PBBFAC,,, | Performed by: PSYCHOLOGIST

## 2019-01-22 PROCEDURE — 99999 PR PBB SHADOW E&M-EST. PATIENT-LVL II: ICD-10-PCS | Mod: PBBFAC,,, | Performed by: PSYCHOLOGIST

## 2019-01-22 PROCEDURE — 90834 PR PSYCHOTHERAPY W/PATIENT, 45 MIN: ICD-10-PCS | Mod: S$GLB,,, | Performed by: PSYCHOLOGIST

## 2019-01-22 PROCEDURE — 90834 PSYTX W PT 45 MINUTES: CPT | Mod: S$GLB,,, | Performed by: PSYCHOLOGIST

## 2019-01-23 NOTE — PROGRESS NOTES
PSYCHO-ONCOLOGY NOTE/ Individual Psychotherapy     Date: 1/22/2019   Site:  [facility]        Therapeutic Intervention: Met with patient. Outpatient - Behavior modifying psychotherapy 45 min - CPT code 53544    The patient's last visit with me was on 10/22/2018.    Problem list  Patient Active Problem List   Diagnosis    Osteopenia of spine    Bone fracture    Malignant neoplasm of overlapping sites of left breast in female, estrogen receptor positive    Moderate episode of recurrent major depressive disorder    Anxiety    Generalized anxiety disorder    Psychophysiological insomnia    Cervical radiculopathy       Chief complaint/reason for encounter: insomnia, depression and anxiety   Met with patient to evaluate psychosocial adaptation to diagnosis/treatment/survivorship of breast cancer and caregiving of a cancer patient    Current Medications  Current Outpatient Medications   Medication    acetaminophen (TYLENOL) 500 MG tablet    albuterol (PROVENTIL/VENTOLIN HFA) 90 mcg/actuation inhaler    azithromycin (Z-JOSUÉ) 250 MG tablet    b complex vitamins tablet    B complex-liver extract Cap    cloNIDine (CATAPRES) 0.1 MG tablet    clopidogrel (PLAVIX) 75 mg tablet    cyanocobalamin 2000 MCG tablet    DULoxetine (CYMBALTA) 30 MG capsule    DULoxetine (CYMBALTA) 30 MG capsule    DULoxetine (CYMBALTA) 60 MG capsule    DULoxetine (CYMBALTA) 60 MG capsule    eluxadoline (VIBERZI) 75 mg Tab    esomeprazole (NEXIUM) 40 MG capsule    esomeprazole (NEXIUM) 40 MG capsule    eszopiclone (LUNESTA) 3 mg Tab    eszopiclone 3 mg Tab    exemestane (AROMASIN) 25 mg tablet    ezetimibe (ZETIA) 10 mg tablet    ferrous sulfate 325 (65 FE) MG EC tablet    furosemide (LASIX) 20 MG tablet    furosemide (LASIX) 20 MG tablet    isosorbide mononitrate (IMDUR) 60 MG 24 hr tablet    KLOR-CON M15 15 mEq tablet    levothyroxine (SYNTHROID) 150 MCG tablet    levothyroxine (SYNTHROID, LEVOTHROID) 175 MCG tablet     LORazepam (ATIVAN) 0.5 MG tablet    LORazepam (ATIVAN) 0.5 MG tablet    metronidazole 1% (METROGEL) 1 % Gel    montelukast (SINGULAIR) 10 mg tablet    nebivolol (BYSTOLIC) 5 MG Tab    nebivolol (BYSTOLIC) 5 MG Tab    NEUPRO 4 mg/24 hour    nitroGLYCERIN (NITROSTAT) 0.4 MG SL tablet    potassium chloride SA (KLOR-CON M15) 15 MEQ tablet    RANEXA 1,000 mg Tb12    spironolactone (ALDACTONE) 25 MG tablet    topiramate (TOPAMAX) 50 MG tablet    topiramate (TOPAMAX) 50 MG tablet    valacyclovir (VALTREX) 1000 MG tablet    valACYclovir (VALTREX) 1000 MG tablet    vitamin D 1000 units Tab     No current facility-administered medications for this visit.        Objective:  Faby Walker arrived 15 minutes late for the session due to getting lost in the hospital.  Ms. Walker was independently ambulatory at the time of session. The patient was fully cooperative throughout the session.  Appearance: age appropriate,  casually dressed, adequately groomed  Behavior/Cooperation: highly distressed, cooperative  Speech: Not pressured, clearly audible, no slurring  Mood: depressed, anxious  Affect: agitated, profusely tearful  Thought Process: goal-directed, logical  Thought Content: normal,  No delusions or paranoia; did not appear to be responding to internal stimuli during the session  Orientation: grossly intact  Memory: Grossly intact  Attention Span/Concentration: Attends to session with difficulty  Fund of Knowledge: average  Estimate of Intelligence: average from verbal skills and history  Cognition: grossly intact  Insight: patient has awareness of illness; good insight into own behavior and behavior of others  Judgment: the patient's behavior is adequate to circumstances    Interval history and content of current session: Patient discussed recent admission of  to hospital, followed by neurosurgery and neuro ICU (x 29 days).  His nausea increased over the holidays and they presented to [facility]  when he started having gait disturbance. 2 intracranial tumors were found and surgically removed. He is currently septic (MRSA) with DVT and PNA. Discussed current adaptation to disease and treatment status and 's treatment status and family's adaptation to both. Reports to be coping with significant difficulty. She has been living in his ICU due to fears of him dying if she leaves. She is struggling with his health-related decision-making. Financial constraints are an additional burden. 's children have been minimally involved.    Discussed caregiving strain and patient's need for regular nutrition and adequate sleep.     Risk parameters:   Patient reports no suicidal ideation  Patient reports no homicidal ideation  Patient reports no self-injurious behavior  Patient reports no violent behavior   Safety needs:  None at this time      Verbal deficits: None     Patient's response to intervention:The patient's response to intervention is accepting.     Progress toward goals and other mental status changes:  The patient's progress toward goals is limited.      Progress to date:Progress as Expected      Goals from last visit: partially met from last visit      Patient reported outcomes:  Distress Thermometer:   Distress Score    Distress Score: 10        Practical Problems Physical Problems                                                   Family Problems                                         Emotional Problems                                                         Spiritual/Religions Concerns               Other Problems          Not completed on this date:   PHQ-9= 4(16 at intake)   JOSE ENRIQUE-7= 4 (13 at intake)      Client Strengths: verbal, intelligent, successful, good social support, commitment to wellness    Diagnosis:     ICD-10-CM ICD-9-CM   1. Generalized anxiety disorder F41.1 300.02   2. Moderate episode of recurrent major depressive disorder F33.1 296.32       Treatment Plan:individual  psychotherapy and medication management by physician  · Target symptoms: depression, anxiety , sleep  · Why chosen therapy is appropriate versus another modality: relevant to diagnosis, evidence based practice  · Outcome monitoring methods: self-report, checklist/rating scale  · Therapeutic intervention type: behavior modifying psychotherapy  · Prognosis: Good      Behavioral goals:    Exercise:    Stress management:  Meditation isauro- use meditation when anxious     Walk to [facility] each day     Regular sleep   Social engagement:    Nutrition: regular nutrition   Smoking Cessation:   Therapy:        Return to clinic: 1 week     Length of Service (minutes direct face-to-face contact): 45    Han Graf, PhD  [place] License #[id number]

## 2019-01-30 ENCOUNTER — HOSPITAL ENCOUNTER (EMERGENCY)
Facility: HOSPITAL | Age: 64
Discharge: HOME | End: 2019-01-30
Attending: EMERGENCY MEDICINE
Payer: COMMERCIAL

## 2019-01-30 VITALS
OXYGEN SATURATION: 94 % | TEMPERATURE: 98 F | DIASTOLIC BLOOD PRESSURE: 67 MMHG | RESPIRATION RATE: 19 BRPM | HEART RATE: 63 BPM | BODY MASS INDEX: 46.07 KG/M2 | WEIGHT: 260 LBS | SYSTOLIC BLOOD PRESSURE: 109 MMHG | HEIGHT: 63 IN

## 2019-01-30 DIAGNOSIS — R06.02 SOB (SHORTNESS OF BREATH): Primary | ICD-10-CM

## 2019-01-30 LAB
ALBUMIN SERPL BCP-MCNC: 3.5 G/DL
ALP SERPL-CCNC: 39 U/L
ALT SERPL W/O P-5'-P-CCNC: 14 U/L
ANION GAP SERPL CALC-SCNC: 10 MMOL/L
AST SERPL-CCNC: 17 U/L
BASOPHILS # BLD AUTO: 0.03 K/UL
BASOPHILS NFR BLD: 0.5 %
BILIRUB SERPL-MCNC: 0.4 MG/DL
BNP SERPL-MCNC: 74 PG/ML
BUN SERPL-MCNC: 21 MG/DL
CALCIUM SERPL-MCNC: 9.3 MG/DL
CHLORIDE SERPL-SCNC: 106 MMOL/L
CO2 SERPL-SCNC: 23 MMOL/L
CREAT SERPL-MCNC: 1.1 MG/DL
D DIMER PPP IA.FEU-MCNC: 0.61 MG/L FEU
DIFFERENTIAL METHOD: ABNORMAL
EOSINOPHIL # BLD AUTO: 0.2 K/UL
EOSINOPHIL NFR BLD: 2.8 %
ERYTHROCYTE [DISTWIDTH] IN BLOOD BY AUTOMATED COUNT: 12.8 %
EST. GFR  (AFRICAN AMERICAN): >60 ML/MIN/1.73 M^2
EST. GFR  (NON AFRICAN AMERICAN): 53.9 ML/MIN/1.73 M^2
GLUCOSE SERPL-MCNC: 101 MG/DL
HCT VFR BLD AUTO: 42.2 %
HGB BLD-MCNC: 13.6 G/DL
IMM GRANULOCYTES # BLD AUTO: 0.02 K/UL
IMM GRANULOCYTES NFR BLD AUTO: 0.3 %
LYMPHOCYTES # BLD AUTO: 1.4 K/UL
LYMPHOCYTES NFR BLD: 23.4 %
MCH RBC QN AUTO: 32.9 PG
MCHC RBC AUTO-ENTMCNC: 32.2 G/DL
MCV RBC AUTO: 102 FL
MONOCYTES # BLD AUTO: 0.5 K/UL
MONOCYTES NFR BLD: 8.4 %
NEUTROPHILS # BLD AUTO: 3.9 K/UL
NEUTROPHILS NFR BLD: 64.6 %
NRBC BLD-RTO: 0 /100 WBC
PLATELET # BLD AUTO: 264 K/UL
PMV BLD AUTO: 9.1 FL
POTASSIUM SERPL-SCNC: 4.1 MMOL/L
PROT SERPL-MCNC: 7.4 G/DL
RBC # BLD AUTO: 4.14 M/UL
SODIUM SERPL-SCNC: 139 MMOL/L
TROPONIN I SERPL DL<=0.01 NG/ML-MCNC: 0.01 NG/ML
TROPONIN I SERPL DL<=0.01 NG/ML-MCNC: <0.006 NG/ML
WBC # BLD AUTO: 6.08 K/UL

## 2019-01-30 PROCEDURE — 93010 ELECTROCARDIOGRAM REPORT: CPT | Mod: ,,, | Performed by: INTERNAL MEDICINE

## 2019-01-30 PROCEDURE — 99285 EMERGENCY DEPT VISIT HI MDM: CPT

## 2019-01-30 PROCEDURE — 93010 EKG 12-LEAD: ICD-10-PCS | Mod: ,,, | Performed by: INTERNAL MEDICINE

## 2019-01-30 PROCEDURE — 99284 EMERGENCY DEPT VISIT MOD MDM: CPT | Mod: ,,, | Performed by: EMERGENCY MEDICINE

## 2019-01-30 PROCEDURE — 99284 PR EMERGENCY DEPT VISIT,LEVEL IV: ICD-10-PCS | Mod: ,,, | Performed by: EMERGENCY MEDICINE

## 2019-01-30 PROCEDURE — 84484 ASSAY OF TROPONIN QUANT: CPT

## 2019-01-30 PROCEDURE — 85379 FIBRIN DEGRADATION QUANT: CPT

## 2019-01-30 PROCEDURE — 85025 COMPLETE CBC W/AUTO DIFF WBC: CPT

## 2019-01-30 PROCEDURE — 93005 ELECTROCARDIOGRAM TRACING: CPT

## 2019-01-30 PROCEDURE — 80053 COMPREHEN METABOLIC PANEL: CPT

## 2019-01-30 PROCEDURE — 83880 ASSAY OF NATRIURETIC PEPTIDE: CPT

## 2019-01-30 NOTE — PROVIDER PROGRESS NOTES - EMERGENCY DEPT.
Encounter Date: 1/30/2019    ED Physician Progress Notes       SCRIBE NOTE: I, Ivan Springer, am scribing for, and in the presence of,  Dr. Manning.  Physician Statement: I, Dr. Manning, personally performed the services described in this documentation as scribed by Ivan Springer in my presence, and it is both accurate and complete.     Physician Note:   EKG - Carolina sinus rhythm.  Rate of 68.      EKG - STEMI Decision  Initial Reading: No STEMI present.    I, Dr. Bruce Manning, personally performed the services described in this documentation. All medical record entries made by the scribe were at my direction and in my presence.  I have reviewed the chart and agree that the record reflects my personal performance and is accurate and complete.     Bruce Manning,   Dept of Emergency Medicine   [facility]  Spectralink: [id number]

## 2019-01-30 NOTE — ED TRIAGE NOTES
"Pt reports worsening SOB over last 10 days; reports dyspnea upon exertion, "I can't even walk down the street"; pt endorses bilateral LE swelling; endorses intermittent CP over last several days, last episode was 36 hrs ago; admits to missing a few doses of diuretic; endorses nonproductive cough; denies fevers; pt A&Ox4; speaking in full sentences with ease; NAD at this time; minimal bilateral LE nonpitting edema; lung sounds clear    Pt also states "I think I have a urinary tract infection"; endorses burning with urination   "

## 2019-01-30 NOTE — ED PROVIDER NOTES
Encounter Date: 1/30/2019    SCRIBE #1 NOTE: I, Son Elaina, am scribing for, and in the presence of,  Dr. Higgins . I have scribed the entire note.       History     Chief Complaint   Patient presents with    Shortness of Breath     hx chf , felt sob while staying with  in icu     Time patient was seen by the provider: 5:18 PM    63 y.o. female with past medical history of CHF, anxiety, asthma, breast CA in remission, presents to the ED with a chief complaint of worsening shortness of breath x2 weeks. Patient states that she is currently staying in the Neuro ICU with her . She reports of feeling anxious and that she has not been sleeping well. She endorses of worsening dyspnea on exertion while walking down the hallways. She states that during the episode she felt as if her heart was palpitations, but when she checked her heart rate it was normal. Endorses some orthopnea on ROS. She states that she normally take lasix 2 times a day, but notes that she has been stop taking one of her doses for the past month. However, she started to take all of her doses today. She also reports of some chest pain when she is anxious. Last episode 3 days ago. No LE swelling, no calf pain, no recent long trips, she has been in the hospital a lot, but no immobility, still walking around, moving.       The history is provided by the patient and medical records.     Review of patient's allergies indicates:   Allergen Reactions    Tessalon [benzonatate] Other (See Comments)     Throat closes up and jaw locks    Bactrim [sulfamethoxazole-trimethoprim] Hives    Codeine Hives    Colestipol Other (See Comments)     Chills, nausea, vomiting and severe shaking, fatigue and weakness    Cardizem [diltiazem hcl] Other (See Comments)    Sulfa (sulfonamide antibiotics) Hives    Percocet [oxycodone-acetaminophen] Hallucinations and Photosensitivity    Percodan [oxycodone hcl-oxycodone-asa] Anxiety    Wellbutrin [bupropion hcl]  Itching     Past Medical History:   Diagnosis Date    Allergy     Anxiety     Asthma     Boils     Back of left thigh, which is not draining but still hard    Breast cancer     Bronchitis     Bursitis     Cervical radiculopathy     CHF (congestive heart failure)     Depression     Herpes genitalis in women     Hx of psychiatric care     Wellbutrin, Remeron, Zoloft, Cymbalta, Ativan    Hypertension     Psychiatric problem     Sleep apnea     Sleep difficulties     Therapy     Thyroid disease      Past Surgical History:   Procedure Laterality Date    ADENOIDECTOMY      APPENDECTOMY      AUGMENTATION OF BREAST      BREAST BIOPSY      BREAST LUMPECTOMY       SECTION      CHOLECYSTECTOMY      Injection, Steroid, Epidural CERVICAL C7-T1 JAMIE N/A 12/10/2018    Performed by Sultana Allen MD at [facility] PAIN MGT    nodule removed frombuttocks - benign      SINUS SURGERY      TONSILLECTOMY      TRIGGER FINGER RELEASE       Family History   Problem Relation Age of Onset    Breast cancer Maternal Aunt     Alcohol abuse Father      Social History     Tobacco Use    Smoking status: Never Smoker    Smokeless tobacco: Never Used   Substance Use Topics    Alcohol use: No    Drug use: No     Review of Systems  Constitutional:  No Fever, No Chills,   Eyes: No Vision Changes  ENT/Mouth: No sore throat, No rhinorrhea  Cardiovascular:  No Chest Pain, No Palpitations  Respiratory:  No Cough, +SOB  Gastrointestinal:  No Nausea, No Vomiting, No Diarrhea, No abdo pain.  Genitourinary:  No  pain,   Musculoskeletal:  No Arthralgias, No Back Pain, No Neck Pain, No recent trauma.  Skin:  No skin Lesions  Neuro:  No Weakness, No Numbness, No Paresthesias, No Dizziness, No Headache     Physical Exam     Initial Vitals [19 1603]   BP Pulse Resp Temp SpO2   133/81 70 18 98.1 °F (36.7 °C) 97 %      MAP       --         Physical Exam    Nursing note and vitals reviewed.      Physical Exam:  GENERAL  APPEARANCE: Well developed, well nourished, in no acute distress.  HENT: Normocephalic, atraumatic    EYES: Sclerae anicteric   NECK: Supple, no thyroid enlargement  CARDIOVASCULAR: Regular rate and rhythm without any murmurs, gallops, rubs.  LUNGS: Speaking in full sentences. Breathing comfortably. Auscultation of the lungs revealed normal breath sounds b/l  ABDOMEN: Soft and nontender, no masses, no rebound or guarding   NEUROLOGIC: Alert, interacting normally. No facial droop.   MSK: Moving all four extremities.,no LE edema  Skin: Warm and dry. No visible rash on exposed areas of skin.    Psych: Mood and affect normal.     ED Course   Procedures  Labs Reviewed   CBC W/ AUTO DIFFERENTIAL - Abnormal; Notable for the following components:       Result Value     (*)     MCH 32.9 (*)     MPV 9.1 (*)     All other components within normal limits   COMPREHENSIVE METABOLIC PANEL - Abnormal; Notable for the following components:    Alkaline Phosphatase 39 (*)     eGFR if non  53.9 (*)     All other components within normal limits   TROPONIN I   B-TYPE NATRIURETIC PEPTIDE     EKG Readings: (Independently Interpreted)   Sinus rhythm, Heart Rate: 68 bpm, leftward axis, non specific t wave abnormality, no old EKG to compare to     ECG Results          EKG 12-lead (In process)  Result time 01/30/19 17:13:51    In process by Interface, Lab In [facility] (01/30/19 17:13:51)                 Narrative:    Test Reason : R06.02,    Vent. Rate : 068 BPM     Atrial Rate : 068 BPM     P-R Int : 168 ms          QRS Dur : 096 ms      QT Int : 400 ms       P-R-T Axes : 045 -17 -40 degrees     QTc Int : 425 ms    Normal sinus rhythm  Nonspecific T wave abnormality  Abnormal ECG  No previous ECGs available    Referred By: [email or web address]   SELF           Confirmed By:                             Imaging Results          X-Ray Chest PA And Lateral (Final result)  Result time 01/30/19 18:33:55    Final result by Brody GIBBONS  MD Manjeet (01/30/19 18:33:55)                 Impression:      1. No acute radiographic findings in the chest.      Electronically signed by: Brody Burroughs MD  Date:    01/30/2019  Time:    18:33             Narrative:    EXAMINATION:  XR CHEST PA AND LATERAL    CLINICAL HISTORY:  Shortness of breath    TECHNIQUE:  PA and lateral views of the chest were performed.    COMPARISON:  Radiograph 12/29/2018.    FINDINGS:  Mediastinal structures are midline.  Hilar contours are unremarkable.  Cardiac silhouette is normal in size.  Lung volumes are symmetric.  Subsegmental band like opacities project over the bilateral lower lung zones, presumably atelectasis or scarring.  No focal areas of consolidation.  Prominent soft tissues project over the right cardiophrenic region.  No pneumothorax or pleural effusions.  No free air beneath the diaphragm.  Degenerative changes of the spine and shoulders noted.  No acute osseous abnormalities.  Surgical clips project over the upper abdomen.                                 Medical Decision Making:   History:   Old Medical Records: I decided to obtain old medical records.  Initial Assessment:   Progressively worsening SOB with exertion in context of only partial compliant of lasix and context of griefment    Will check labs for signs of ACS, CHF or electrolytic or hematologic abnormality, chest x-ray to look for pulmonary pathology.     Independently Interpreted Test(s):   I have ordered and independently interpreted EKG Reading(s) - see prior notes  Clinical Tests:   Lab Tests: Ordered and Reviewed  Radiological Study: Ordered and Reviewed  Medical Tests: Ordered and Reviewed  ED Management:  Update:  Labs are unremarkable  Troponin is negative x2,  EKG is as noted but no old to compared,   BMP is low  No signs of fluid overload on x-ray.  Given hx of breast CA, and negative work-up otherwise, dimer obtained. 0.61, which given's patient age of 63, is negative according to age  adjusted D-dimer. She is low risk and exhibits no LE swelling, pain, and CA is in remission.     Walked around the ED, and pt does not appear dyspneic. Not consistent with PE at this time.     Not consistent with dangerous pathology at this time. Will have pt f/u with PMD and presume lasix as prescribed.   ED return instructions.                         Clinical Impression:   The encounter diagnosis was SOB (shortness of breath).      Disposition:   Disposition: Discharged  Condition: Stable                        Bud Higgins MD  01/31/19 [id number]

## 2019-01-30 NOTE — ED NOTES
LOC: The patient is awake, alert, and aware of environment. The patient is oriented x 3 and speaking appropriately.   APPEARANCE: No acute distress noted.   PSYCHOSOCIAL: Patient is calm and cooperative.   SKIN: The skin is warm, dry.   RESPIRATORY: Airway is open and patent. Bilateral chest rise and fall. Respirations are spontaneous, even and unlabored. Normal effort and rate noted. No accessory muscle use noted.   CARDIAC: Patient has a normal rate and rhythm. Denies chest pain at this time.   ABDOMEN: Soft and non tender to palpation. No distention noted.   URINARY:  Voids independently.   EXTREMITIES: mild, nonpitting edema noted to bilateral LE  NEUROLOGIC: Eyes open spontaneously. Speech clear. Tolerating saliva secretions well. Able to follow commands, demonstrating ability to actively and appropriately communicate within context of current conversation. Symmetrical facial muscles. Moving all extremities well. Movement is purposeful.   MUSCULOSKELETAL: No obvious deformities noted.

## 2019-01-31 NOTE — DISCHARGE INSTRUCTIONS
Your labs today were unremarkable.  Your chest x-ray was unremarkable.    Your symptoms today are not consistent with pathology that his dangerous at this time.  Please follow-up within 1 week with her primary care doctor for continued evaluation.  Please take medication as prescribed.  Including the 2nd dose of Lasix that you are on.    If you experience chest pain, worsening trouble breathing, fever, or any other new, worsening or worrisome symptoms please return to the emergency department for repeat evaluation.

## 2019-02-01 ENCOUNTER — OFFICE VISIT (OUTPATIENT)
Dept: PSYCHIATRY | Facility: CLINIC | Age: 64
End: 2019-02-01
Payer: COMMERCIAL

## 2019-02-01 DIAGNOSIS — F33.1 MODERATE EPISODE OF RECURRENT MAJOR DEPRESSIVE DISORDER: Primary | ICD-10-CM

## 2019-02-01 DIAGNOSIS — F41.1 GENERALIZED ANXIETY DISORDER: ICD-10-CM

## 2019-02-01 PROCEDURE — 99999 PR PBB SHADOW E&M-EST. PATIENT-LVL II: ICD-10-PCS | Mod: PBBFAC,,, | Performed by: PSYCHOLOGIST

## 2019-02-01 PROCEDURE — 90834 PSYTX W PT 45 MINUTES: CPT | Mod: S$GLB,,, | Performed by: PSYCHOLOGIST

## 2019-02-01 PROCEDURE — 90834 PR PSYCHOTHERAPY W/PATIENT, 45 MIN: ICD-10-PCS | Mod: S$GLB,,, | Performed by: PSYCHOLOGIST

## 2019-02-01 PROCEDURE — 99999 PR PBB SHADOW E&M-EST. PATIENT-LVL II: CPT | Mod: PBBFAC,,, | Performed by: PSYCHOLOGIST

## 2019-02-04 NOTE — PROGRESS NOTES
PSYCHO-ONCOLOGY NOTE/ Individual Psychotherapy     Date: 2/1/2019   [facility]:  [facility]        Therapeutic Intervention: Met with patient. Outpatient - Behavior modifying psychotherapy 45 min - CPT code 50416    The patient's last visit with me was on 1/22/2019.    Problem list  Patient Active Problem List   Diagnosis    Osteopenia of spine    Bone fracture    Malignant neoplasm of overlapping sites of left breast in female, estrogen receptor positive    Moderate episode of recurrent major depressive disorder    Anxiety    Generalized anxiety disorder    Psychophysiological insomnia    Cervical radiculopathy       Chief complaint/reason for encounter: insomnia, depression and anxiety   Met with patient to evaluate psychosocial adaptation to diagnosis/treatment/survivorship of breast cancer and caregiving of a cancer patient    Current Medications  Current Outpatient Medications   Medication    acetaminophen (TYLENOL) 500 MG tablet    albuterol (PROVENTIL/VENTOLIN HFA) 90 mcg/actuation inhaler    azithromycin (Z-JOSUÉ) 250 MG tablet    b complex vitamins tablet    B complex-liver extract Cap    cloNIDine (CATAPRES) 0.1 MG tablet    clopidogrel (PLAVIX) 75 mg tablet    cyanocobalamin 2000 MCG tablet    DULoxetine (CYMBALTA) 30 MG capsule    DULoxetine (CYMBALTA) 30 MG capsule    DULoxetine (CYMBALTA) 60 MG capsule    DULoxetine (CYMBALTA) 60 MG capsule    eluxadoline (VIBERZI) 75 mg Tab    esomeprazole (NEXIUM) 40 MG capsule    esomeprazole (NEXIUM) 40 MG capsule    eszopiclone (LUNESTA) 3 mg Tab    eszopiclone 3 mg Tab    exemestane (AROMASIN) 25 mg tablet    ezetimibe (ZETIA) 10 mg tablet    ferrous sulfate 325 (65 FE) MG EC tablet    furosemide (LASIX) 20 MG tablet    furosemide (LASIX) 20 MG tablet    isosorbide mononitrate (IMDUR) 60 MG 24 hr tablet    KLOR-CON M15 15 mEq tablet    levothyroxine (SYNTHROID) 150 MCG tablet    levothyroxine (SYNTHROID, LEVOTHROID) 175 MCG tablet     LORazepam (ATIVAN) 0.5 MG tablet    LORazepam (ATIVAN) 0.5 MG tablet    metronidazole 1% (METROGEL) 1 % Gel    montelukast (SINGULAIR) 10 mg tablet    nebivolol (BYSTOLIC) 5 MG Tab    nebivolol (BYSTOLIC) 5 MG Tab    NEUPRO 4 mg/24 hour    nitroGLYCERIN (NITROSTAT) 0.4 MG SL tablet    potassium chloride SA (KLOR-CON M15) 15 MEQ tablet    RANEXA 1,000 mg Tb12    spironolactone (ALDACTONE) 25 MG tablet    topiramate (TOPAMAX) 50 MG tablet    topiramate (TOPAMAX) 50 MG tablet    valacyclovir (VALTREX) 1000 MG tablet    valACYclovir (VALTREX) 1000 MG tablet    vitamin D 1000 units Tab     No current facility-administered medications for this visit.        Objective:  Faby Walker arrived 15 minutes late for the session.  Ms. Walker was independently ambulatory at the time of session. The patient was fully cooperative throughout the session.  Appearance: age appropriate,  casually dressed, adequately groomed  Behavior/Cooperation: highly distressed, cooperative  Speech: Not pressured, clearly audible, no slurring  Mood: depressed, anxious  Affect: agitated, profusely tearful  Thought Process: goal-directed, logical  Thought Content: normal,  No delusions or paranoia; did not appear to be responding to internal stimuli during the session  Orientation: grossly intact  Memory: Grossly intact  Attention Span/Concentration: Attends to session with difficulty  Fund of Knowledge: average  Estimate of Intelligence: average from verbal skills and history  Cognition: grossly intact  Insight: patient has awareness of illness; good insight into own behavior and behavior of others  Judgment: the patient's behavior is adequate to circumstances    Interval history and content of current session: Patient discussed ongoing health struggles of , including recent 2 day period without pain control due to AMS. Patient processed feelings of guilt and grief over allowing him to be in pain. Patient went home for 2  days- unable to sleep while alone in home. Also experienced discord with son due to repossession of the trailer in which he was living. Discussed current adaptation to disease and treatment status and 's treatment status and family's adaptation to both. Reports to be coping with significant difficulty. She continues to live in his ICU room due to fears of him dying if she leaves. She is struggling with his health-related decision-making. Financial constraints are an additional burden. 's children have been minimally involved, but appear more understanding of health challenges now that they have talked to Dr. Rios.    Discussed caregiving strain and patient's need for regular nutrition and adequate sleep.  (Patient made ED visit due to perceived cardiac event likely caused by anxiety and sleep deprivation.)      Risk parameters:   Patient reports no suicidal ideation  Patient reports no homicidal ideation  Patient reports no self-injurious behavior  Patient reports no violent behavior   Safety needs:  None at this time      Verbal deficits: None     Patient's response to intervention:The patient's response to intervention is accepting.     Progress toward goals and other mental status changes:  The patient's progress toward goals is limited.      Progress to date:Progress as Expected      Goals from last visit: partially met from last visit      Patient reported outcomes:  Distress Thermometer:   Distress Score    Distress Score: 10        Practical Problems Physical Problems                                                   Family Problems                                         Emotional Problems                                                         Spiritual/Religions Concerns               Other Problems          Not completed on this date:   PHQ-9= (16 at intake)   JOSE ENRIQUE-7=  (13 at intake)      Client Strengths: verbal, intelligent, successful, good social support, commitment to  wellness    Diagnosis:     ICD-10-CM ICD-9-CM   1. Moderate episode of recurrent major depressive disorder F33.1 296.32   2. Generalized anxiety disorder F41.1 300.02       Treatment Plan:individual psychotherapy and medication management by physician  · Target symptoms: depression, anxiety , sleep  · Why chosen therapy is appropriate versus another modality: relevant to diagnosis, evidence based practice  · Outcome monitoring methods: self-report, checklist/rating scale  · Therapeutic intervention type: behavior modifying psychotherapy  · Prognosis: Good      Behavioral goals:    Exercise:    Stress management:  Meditation isauro- use meditation when anxious     Walk to [facility] each day     Regular sleep   Social engagement:    Nutrition: regular nutrition   Smoking Cessation:   Therapy:  Talk to 's son about arranging a visit to take over his care for a few days        Return to clinic: 1 week     Length of Service (minutes direct face-to-face contact): 45    Han Graf, PhD  [place] License #[id number]

## 2019-02-22 ENCOUNTER — TELEPHONE (OUTPATIENT)
Dept: PSYCHIATRY | Facility: CLINIC | Age: 64
End: 2019-02-22

## 2019-02-22 NOTE — TELEPHONE ENCOUNTER
Attempted to call patient, as scheduled, for telephone visit. No Answer x 2. Patient does not have voice mail.  MANDY Graf, PhD

## 2019-02-25 ENCOUNTER — TELEPHONE (OUTPATIENT)
Dept: PSYCHIATRY | Facility: CLINIC | Age: 64
End: 2019-02-25

## 2019-02-25 NOTE — TELEPHONE ENCOUNTER
Attempted to contact patient, again, about missed contact on 2/22/19. No answer, VM not set up.   MANDY Graf, PhD

## 2019-02-28 ENCOUNTER — TELEPHONE (OUTPATIENT)
Dept: PSYCHIATRY | Facility: CLINIC | Age: 64
End: 2019-02-28

## 2019-02-28 NOTE — TELEPHONE ENCOUNTER
Spoke to patient by phone in response to phone message. Patient had been hospitalized due to respiratory distress since last Friday.   was placed in a nursing home for care while she was admitted and remains there. Patient distressed, but has adequate support at present time.  MANDY Graf, PhD

## 2019-03-14 ENCOUNTER — TELEPHONE (OUTPATIENT)
Dept: PSYCHIATRY | Facility: CLINIC | Age: 64
End: 2019-03-14

## 2019-03-14 NOTE — TELEPHONE ENCOUNTER
Patient has discovered a large number of personal and financial improprieties by  which have now prevented her from getting him Medicaid. Dicussed her emotional coping with these revelations. Encouraged self-care.  Patient will recontact as needed.  MANDY Graf, PhD

## 2019-05-14 ENCOUNTER — TELEPHONE (OUTPATIENT)
Dept: HEMATOLOGY/ONCOLOGY | Facility: CLINIC | Age: 64
End: 2019-05-14

## 2019-05-14 NOTE — TELEPHONE ENCOUNTER
----- Message from Agatha Mcintosh sent at 5/14/2019  1:27 PM CDT -----  Contact: Pt   Name of Who is Calling: POORNIMA WILKINS [[id number]]    What is the request in detail: Pt called to schedule her IV tomorrow if possible. Please call to further discuss and advise.     Can the clinic reply by [facility]: No     [facility]: [phone]

## 2019-06-20 ENCOUNTER — OFFICE VISIT (OUTPATIENT)
Dept: PSYCHIATRY | Facility: CLINIC | Age: 64
End: 2019-06-20
Payer: COMMERCIAL

## 2019-06-20 DIAGNOSIS — F33.1 MODERATE EPISODE OF RECURRENT MAJOR DEPRESSIVE DISORDER: Primary | ICD-10-CM

## 2019-06-20 DIAGNOSIS — F41.1 GENERALIZED ANXIETY DISORDER: ICD-10-CM

## 2019-06-20 PROCEDURE — 90834 PR PSYCHOTHERAPY W/PATIENT, 45 MIN: ICD-10-PCS | Mod: 95,,, | Performed by: PSYCHOLOGIST

## 2019-06-20 PROCEDURE — 90834 PSYTX W PT 45 MINUTES: CPT | Mod: 95,,, | Performed by: PSYCHOLOGIST

## 2019-06-20 PROCEDURE — 99212 OFFICE O/P EST SF 10 MIN: CPT | Performed by: PSYCHOLOGIST

## 2019-06-20 NOTE — PROGRESS NOTES
Telemedicine PSYCHO-ONCOLOGY NOTE/ Individual Psychotherapy       Consultation started: 2019 at 5:02 PM   The chief complaint leading to consultation is: depression, anxiety  The patient location is: her home, [place], [place]  Patient alone at the time of the consultation  Virtual visit with synchronous audio and video      Date: 2019   [facility]         Therapeutic Intervention: Met with patient.  Outpatient - Behavior modifying psychotherapy 45 min - CPT code 52632    Chief complaint/reason for encounter: depression and anxiety, bereavement     Patient was last seen by me on 2019    Objective:      Faby Walker arrived promptly for the session.  Ms. Walker was independently ambulatory at the time of session. The patient was fully cooperative throughout the session.  Appearance: age appropriate, casually  dressed, well groomed  Behavior/Cooperation: friendly and cooperative  Speech: normal in rate, volume, and tone and appropriate quality, quantity and organization of sentences  Mood: anxious, depressed, sad  Affect: anxious and dysphoric, profusely tearful throuhgout  Thought Process: goal-directed, logical  Thought Content: normal,  No delusions or paranoia; did not appear to be responding to internal stimuli during the session  Orientation: grossly intact  Memory: Grossly intact  Attention Span/Concentration: Attends to session without distraction; reports no difficulty  Fund of Knowledge: average  Estimate of Intelligence: average from verbal skills and history  Cognition: grossly intact  Insight: patient has awareness of illness; good insight into own behavior and behavior of others  Judgment: the patient's behavior is adequate to circumstances    Interval history and content of current session: Patient's   19. Discussed current adaptation to s death and family's adaptation to his death. Reports to be coping with significant difficulty. Evaluated  cognitive response, paying particular attention to negative intrusive thoughts of a persistent and detrimental nature. Thoughts of this type are in evidence with severe distress. Identified and evaluated psychosocial and environmental stressors secondary to his death.  Most prominent stressors are financial strain due to his unacknowledged theft of their resources and gambling addiction.  Patient struggling to forgive him and not feel guilty about her anger over his behavior.  His ex-wife has also started harassing patient through spreading malicious rumors in their small town. Patient struggling to mentally accommodate the evidence of deceitful behavior in conflict with her 's loving demeanor.     Examined proactive behaviors that may be implemented to minimize or ameliorate psychosocial stressors.  Focus on rebuilding sense of self and self-control.  Discussed assertive responses to difficult social situations.      Risk parameters:   Patient reports no suicidal ideation  Patient reports no homicidal ideation  Patient reports no self-injurious behavior  Patient reports no violent behavior   Safety needs:  None at this time      Verbal deficits: None     Patient's response to intervention:The patient's response to intervention is motivated.     Progress toward goals and other mental status changes:  The patient's progress toward goals is good.      Progress to date:Progress as Expected      Goals from last visit: Met      Patient reported outcomes: not completed on this date     Distress Thermometer:   Distress Score    Distress Score: 10        Practical Problems Physical Problems                                                   Family Problems                                         Emotional Problems                                                         Spiritual/Religions Concerns               Other Problems              PHQ-9=  not completed on this date JOSE ENRIQUE-7= not completed on this date     Client  Strengths: verbal, intelligent, successful, adeqeate social support, good insight, commitment to wellness, strong cezar, strong cultural traditions      Treatment Plan:individual psychotherapy and medication management by physician  · Target symptoms: depression, anxiety , grief  · Why chosen therapy is appropriate versus another modality: relevant to diagnosis, patient responds to this modality, evidence based practice  · Outcome monitoring methods: self-report, checklist/rating scale  · Therapeutic intervention type: behavior modifying psychotherapy, supportive psychotherapy  · Prognosis: Good      Behavioral goals:    Exercise: daily walking as per her cardiologist   Stress management:  Yoga class   Social engagement: time with friends (Jocy, nurse friends)   Nutrition:   Smoking Cessation:   Therapy: return to Feeling Good book    Assertive self-talk if confronted    Return to clinic: 3 weeks, sooner if needed     Length of Service (minutes direct face-to-face contact): 45      Each patient provided medical services by telemedicine is:  (1) informed of the relationship between the physician and patient and the respective role of any other health care provider with respect to management of the patient; and (2) notified that he or she may decline to receive medical services by telemedicine and may withdraw from such care at any time      Han Graf, PhD  [place] License #[id number]

## 2019-06-27 ENCOUNTER — OFFICE VISIT (OUTPATIENT)
Dept: HEMATOLOGY/ONCOLOGY | Facility: CLINIC | Age: 64
End: 2019-06-27
Payer: COMMERCIAL

## 2019-06-27 ENCOUNTER — INFUSION (OUTPATIENT)
Dept: INFUSION THERAPY | Facility: OTHER | Age: 64
End: 2019-06-27
Attending: INTERNAL MEDICINE
Payer: COMMERCIAL

## 2019-06-27 ENCOUNTER — HOSPITAL ENCOUNTER (OUTPATIENT)
Dept: RADIOLOGY | Facility: HOSPITAL | Age: 64
Discharge: HOME | End: 2019-06-27
Attending: INTERNAL MEDICINE
Payer: COMMERCIAL

## 2019-06-27 VITALS
OXYGEN SATURATION: 95 % | WEIGHT: 256.38 LBS | BODY MASS INDEX: 43.77 KG/M2 | HEIGHT: 64 IN | TEMPERATURE: 98 F | RESPIRATION RATE: 17 BRPM | DIASTOLIC BLOOD PRESSURE: 66 MMHG | HEART RATE: 66 BPM | SYSTOLIC BLOOD PRESSURE: 107 MMHG

## 2019-06-27 VITALS — HEIGHT: 63 IN | BODY MASS INDEX: 46.07 KG/M2 | WEIGHT: 260 LBS

## 2019-06-27 DIAGNOSIS — Z17.0 MALIGNANT NEOPLASM OF OVERLAPPING SITES OF LEFT BREAST IN FEMALE, ESTROGEN RECEPTOR POSITIVE: ICD-10-CM

## 2019-06-27 DIAGNOSIS — M85.88 OSTEOPENIA OF SPINE: Primary | ICD-10-CM

## 2019-06-27 DIAGNOSIS — E55.9 VITAMIN D DEFICIENCY: ICD-10-CM

## 2019-06-27 DIAGNOSIS — Z17.0 MALIGNANT NEOPLASM OF OVERLAPPING SITES OF LEFT BREAST IN FEMALE, ESTROGEN RECEPTOR POSITIVE: Primary | ICD-10-CM

## 2019-06-27 DIAGNOSIS — C50.812 MALIGNANT NEOPLASM OF OVERLAPPING SITES OF LEFT BREAST IN FEMALE, ESTROGEN RECEPTOR POSITIVE: ICD-10-CM

## 2019-06-27 DIAGNOSIS — M85.88 OSTEOPENIA OF SPINE: ICD-10-CM

## 2019-06-27 DIAGNOSIS — C50.812 MALIGNANT NEOPLASM OF OVERLAPPING SITES OF LEFT BREAST IN FEMALE, ESTROGEN RECEPTOR POSITIVE: Primary | ICD-10-CM

## 2019-06-27 DIAGNOSIS — T14.8XXA BONE FRACTURE: ICD-10-CM

## 2019-06-27 DIAGNOSIS — Z63.4 BEREAVEMENT: ICD-10-CM

## 2019-06-27 PROCEDURE — 3008F PR BODY MASS INDEX (BMI) DOCUMENTED: ICD-10-PCS | Mod: CPTII,S$GLB,, | Performed by: INTERNAL MEDICINE

## 2019-06-27 PROCEDURE — 25000003 PHARM REV CODE 250: Performed by: INTERNAL MEDICINE

## 2019-06-27 PROCEDURE — 99214 OFFICE O/P EST MOD 30 MIN: CPT | Mod: S$GLB,,, | Performed by: INTERNAL MEDICINE

## 2019-06-27 PROCEDURE — 63600175 PHARM REV CODE 636 W HCPCS: Performed by: INTERNAL MEDICINE

## 2019-06-27 PROCEDURE — 96365 THER/PROPH/DIAG IV INF INIT: CPT

## 2019-06-27 PROCEDURE — 77066 DX MAMMO INCL CAD BI: CPT | Mod: TC,PO

## 2019-06-27 PROCEDURE — 77062 BREAST TOMOSYNTHESIS BI: CPT | Mod: 26,,, | Performed by: RADIOLOGY

## 2019-06-27 PROCEDURE — 77062 MAMMO DIGITAL DIAGNOSTIC BILAT WITH TOMOSYNTHESIS_CAD: ICD-10-PCS | Mod: 26,,, | Performed by: RADIOLOGY

## 2019-06-27 PROCEDURE — 99999 PR PBB SHADOW E&M-EST. PATIENT-LVL III: ICD-10-PCS | Mod: PBBFAC,,, | Performed by: INTERNAL MEDICINE

## 2019-06-27 PROCEDURE — 99214 PR OFFICE/OUTPT VISIT, EST, LEVL IV, 30-39 MIN: ICD-10-PCS | Mod: S$GLB,,, | Performed by: INTERNAL MEDICINE

## 2019-06-27 PROCEDURE — 3078F PR MOST RECENT DIASTOLIC BLOOD PRESSURE < 80 MM HG: ICD-10-PCS | Mod: CPTII,S$GLB,, | Performed by: INTERNAL MEDICINE

## 2019-06-27 PROCEDURE — 3078F DIAST BP <80 MM HG: CPT | Mod: CPTII,S$GLB,, | Performed by: INTERNAL MEDICINE

## 2019-06-27 PROCEDURE — 77066 DX MAMMO INCL CAD BI: CPT | Mod: 26,,, | Performed by: RADIOLOGY

## 2019-06-27 PROCEDURE — 99999 PR PBB SHADOW E&M-EST. PATIENT-LVL III: CPT | Mod: PBBFAC,,, | Performed by: INTERNAL MEDICINE

## 2019-06-27 PROCEDURE — 3074F SYST BP LT 130 MM HG: CPT | Mod: CPTII,S$GLB,, | Performed by: INTERNAL MEDICINE

## 2019-06-27 PROCEDURE — 3008F BODY MASS INDEX DOCD: CPT | Mod: CPTII,S$GLB,, | Performed by: INTERNAL MEDICINE

## 2019-06-27 PROCEDURE — 77066 MAMMO DIGITAL DIAGNOSTIC BILAT WITH TOMOSYNTHESIS_CAD: ICD-10-PCS | Mod: 26,,, | Performed by: RADIOLOGY

## 2019-06-27 PROCEDURE — 3074F PR MOST RECENT SYSTOLIC BLOOD PRESSURE < 130 MM HG: ICD-10-PCS | Mod: CPTII,S$GLB,, | Performed by: INTERNAL MEDICINE

## 2019-06-27 RX ORDER — TRAZODONE HYDROCHLORIDE 100 MG/1
100 TABLET ORAL
COMMUNITY

## 2019-06-27 RX ORDER — PANTOPRAZOLE SODIUM 40 MG/1
TABLET, DELAYED RELEASE ORAL
COMMUNITY
Start: 2017-12-13

## 2019-06-27 RX ORDER — SODIUM CHLORIDE 0.9 % (FLUSH) 0.9 %
10 SYRINGE (ML) INJECTION
Status: DISCONTINUED | OUTPATIENT
Start: 2019-06-27 | End: 2019-06-27 | Stop reason: HOSPADM

## 2019-06-27 RX ORDER — HEPARIN 100 UNIT/ML
500 SYRINGE INTRAVENOUS
OUTPATIENT
Start: 2019-06-27

## 2019-06-27 RX ORDER — VIT C/E/ZN/COPPR/LUTEIN/ZEAXAN 250MG-90MG
CAPSULE ORAL
COMMUNITY
Start: 2018-07-09

## 2019-06-27 RX ORDER — IPRATROPIUM BROMIDE AND ALBUTEROL SULFATE 2.5; .5 MG/3ML; MG/3ML
3 SOLUTION RESPIRATORY (INHALATION) EVERY 6 HOURS PRN
COMMUNITY

## 2019-06-27 RX ORDER — SODIUM CHLORIDE 0.9 % (FLUSH) 0.9 %
10 SYRINGE (ML) INJECTION
OUTPATIENT
Start: 2019-06-27

## 2019-06-27 RX ORDER — ASPIRIN 81 MG/1
81 TABLET ORAL DAILY
COMMUNITY

## 2019-06-27 RX ADMIN — ZOLEDRONIC ACID 4 MG: 4 INJECTION, SOLUTION, CONCENTRATE INTRAVENOUS at 12:06

## 2019-06-27 SDOH — SOCIAL DETERMINANTS OF HEALTH (SDOH): DISSAPEARANCE AND DEATH OF FAMILY MEMBER: Z63.4

## 2019-06-27 NOTE — PROGRESS NOTES
Subjective:      Patient ID: Faby Walker is a 64 y.o. female.     Chief Complaint: follow up for breast cancer     Diagnosis:  Stage IA ([id number]) multifocal invasive ductal carcinoma of the left breast diagnosed on 2014, ER/ME+, HER2 neg.      Treatment history:   1. Core needle biopsy of left breast lesion on 14 showed invasive ductal carcinoma, grade 2, ER positive (78.9%), ME positive (79.7%), HER2 negative (0 by IHC). Ki-67 22.4%.    2. Ms. Walker underwent left lumpectomy + SLND on 2014. Pathology showed invasive ductal carcinoma, multifocal, largest 0.55 cm, grade 2, a second one 0.2 cm, a third one 0.1 cm, negative margin. DCIS (+margin). Four sentinel lymph nodes were removed and negative for malignancy.   3. Radiation of 61.2Gy to left breast 14-14.  4. Endocrine therapy with tamoxifen, letrozole and anastrozole, stopped due to severe myalgias  5. Most recently mammogram on 18 was negative  6. Started on exemestane after transferring her care to me in 2017.   7. DEXA scan on 8/15/2017 showed T score of lumbar spine -1.4, consistent with osteopenia and increased risk of fracture.   8. Received zometa in  and 2018.      History of Present Illness:   Ms Walker returns today for follow up. Mr Walker passed away on 19. She is still in bereavement. Has itching over the left breast but denies other complaints. She is taking exemestane.      ECO     ROS:   A ten-point system review is obtained and negative except for what was stated in the HPI.      Physical Examination:   Vital signs reviewed.   Gen: well hydrated, well developed, in no acute distress.  HEENT: normocephalic, anicteric sclerae, PERRLA, EOMI, oropharynx clear  Neck: supple, no JVD, thyromegaly, cervical or supraclavicular LAD  Lungs: CTAB, no wheezes or rales  Heart: RRR, no M/R/G  Breasts: L: no skin nodules, no masses or axillary LAD. R: no abnormal skin changes, masses or axillary  LAD  Abdomen: soft, no tenderness, non-distended, no hepatosplenomegaly, mass, or hernia. BS present  Ext: no clubbing, cyanosis, or edema  Neuro: alert and oriented x 4, no focal neuro deficit  Skin: no rash, erythema, open wound or ulcers  Psych: pleasant and appropriate mood and affect        Objective:      Diagnostic Tests:  Mammogram today 6/27/19 is normal     DEXA scan on 8/15/2017: T score of lumbar spine -1.4, consistent with osteopenia and increased risk of fracture.            Assessment/Plan:      1. Malignant neoplasm of overlapping sites of left breast in female, estrogen receptor positive    2. Osteopenia of spine    3. Vitamin D deficiency    4. Bereavement        1  - mammogram negative  - CRISTOBAL  - c/w exemestane for a total of 5 years  - due for mammogram in June 2020    2.  - #3 zometa today  - return in 6 months for bone density and #4    3.  - c/w OTC vit D  - check vit D level on return    4.  - I spent 20 minutes on bereavement counseling.

## 2019-06-27 NOTE — PROGRESS NOTES
Subjective:      Patient ID: Faby Walker is a 62 y.o. female.     Chief Complaint: follow up for breast cancer     Diagnosis:  Stage IA ([id number]) multifocal invasive ductal carcinoma of the left breast diagnosed on 2014, ER/CT+, HER2 neg.      Treatment history:   1. Core needle biopsy of left breast lesion on 14 showed invasive ductal carcinoma, grade 2, ER positive (78.9%), CT positive (79.7%), HER2 negative (0 by IHC). Ki-67 22.4%.    2. Ms. Walker underwent left lumpectomy + SLND on 2014. Pathology showed invasive ductal carcinoma, multifocal, largest 0.55 cm, grade 2, a second one 0.2 cm, a third one 0.1 cm, negative margin. DCIS (+margin). Four sentinel lymph nodes were removed and negative for malignancy.   3. Radiation of 61.2Gy to left breast 14-14.  4. Endocrine therapy with tamoxifen, letrozole and anastrozole, stopped due to severe myalgias  5. Most recently mammogram on 18 was negative  6. Started on exemestane after transferring her care to me in 2017.   7. DEXA scan on 8/15/2017 showed T score of lumbar spine -1.4, consistent with osteopenia and increased risk of fracture.   8. Received zometa in 2018.      History of Present Illness:   Ms. Walker is a very pleasant 61 yo  woman with HTN, GERD, asthma, history of early stage left breast cancer, who presents today for follow up. She has been taking exemestane since 2017 and has tolerated it very well. She continues to have pain and itching in her left breast. Has put some moisturizing cream over the breast. Last received zometa on 18. Had a tooth that got decayed and pulled in 2018. Seen by pain management today for joint pain, was felt to have bursitis but need to r/o autoimmune disease. +depression     ECO     ROS:   A ten-point system review is obtained and negative except for what was stated in the HPI.      Physical Examination:   Vital signs reviewed.   Gen: well  hydrated, well developed, in no acute distress.  HEENT: normocephalic, anicteric sclerae, PERRLA, EOMI, oropharynx clear  Neck: supple, no JVD, thyromegaly, cervical or supraclavicular LAD  Lungs: CTAB, no wheezes or rales  Heart: RRR, no M/R/G  Breasts: L: mild petechiae and erythema, no masses or axillary LAD. R: no abnormal skin changes, masses or axillary LAD  Abdomen: soft, no tenderness, non-distended, no hepatosplenomegaly, mass, or hernia. BS present  Ext: no clubbing, cyanosis, or edema  Neuro: alert and oriented x 4, no focal neuro deficit  Skin: no rash, erythema, open wound or ulcers  Psych: pleasant and appropriate mood and affect        Objective:      Diagnostic Tests:  Mammogram on 5/14/18 normal     DEXA scan on 8/15/2017: T score of lumbar spine -1.4, consistent with osteopenia and increased risk of fracture.            Assessment/Plan:      1. Malignant neoplasm of overlapping sites of left breast in female, estrogen receptor positive    2. Breast pain, left    3. Erythema    4. Cancer treatment induced bone loss    5. Osteopenia, unspecified location    6. Moderate episode of recurrent major depressive disorder          1-3  - continues to have itching. Mild erythema on the breast today. Will repeat a mammogram of the left breast  - ask patient to apply OTC hydrocortisone cream  - c/w exemestane     4.5.   - #2 zometa today  - return in 6 months for #3     6.  - denies SI/HI  - She will call Dr. Graf's office today to schedule follow up appointment

## 2019-07-07 ENCOUNTER — PATIENT MESSAGE (OUTPATIENT)
Dept: HEMATOLOGY/ONCOLOGY | Facility: CLINIC | Age: 64
End: 2019-07-07

## 2019-07-08 ENCOUNTER — PATIENT MESSAGE (OUTPATIENT)
Dept: HEMATOLOGY/ONCOLOGY | Facility: CLINIC | Age: 64
End: 2019-07-08

## 2019-07-08 DIAGNOSIS — D75.89 MACROCYTOSIS: Primary | ICD-10-CM

## 2019-07-08 NOTE — TELEPHONE ENCOUNTER
Spoke with patient. WBC and Hb was normal, not low. MCV slightly elevated. Will check vit B12 and folate when she returns for bone density this week. She understands and agrees with the plan

## 2019-07-10 ENCOUNTER — PATIENT MESSAGE (OUTPATIENT)
Dept: PSYCHIATRY | Facility: CLINIC | Age: 64
End: 2019-07-10

## 2019-07-11 ENCOUNTER — OFFICE VISIT (OUTPATIENT)
Dept: PSYCHIATRY | Facility: CLINIC | Age: 64
End: 2019-07-11
Payer: COMMERCIAL

## 2019-07-11 ENCOUNTER — PATIENT MESSAGE (OUTPATIENT)
Dept: PSYCHIATRY | Facility: CLINIC | Age: 64
End: 2019-07-11

## 2019-07-11 DIAGNOSIS — F41.1 GENERALIZED ANXIETY DISORDER: ICD-10-CM

## 2019-07-11 DIAGNOSIS — F33.1 MODERATE EPISODE OF RECURRENT MAJOR DEPRESSIVE DISORDER: Primary | ICD-10-CM

## 2019-07-11 PROCEDURE — 90834 PSYTX W PT 45 MINUTES: CPT | Mod: 95,,, | Performed by: PSYCHOLOGIST

## 2019-07-11 PROCEDURE — 90834 PR PSYCHOTHERAPY W/PATIENT, 45 MIN: ICD-10-PCS | Mod: 95,,, | Performed by: PSYCHOLOGIST

## 2019-07-11 RX ORDER — TEMAZEPAM 15 MG/1
15 CAPSULE ORAL
COMMUNITY
Start: 2019-07-09

## 2019-07-11 NOTE — PROGRESS NOTES
Telemedicine PSYCHO-ONCOLOGY NOTE/ Individual Psychotherapy       Consultation started: 7/11/2019 at 5:04 PM   The chief complaint leading to consultation is: depression, anxiety  The patient location is: her home, [place], [place]  Patient alone at the time of the consultation  Virtual visit with synchronous audio and video      Date: 7/11/2019   Site of therapist:  [facility]         Therapeutic Intervention: Met with patient.  Outpatient - Behavior modifying psychotherapy 45 min - CPT code 51854    Chief complaint/reason for encounter: depression and anxiety, bereavement     Patient was last seen by me on 6/20/2019    Objective:      Faby Walker arrived promptly for the session, despite significant AV challenges.  Ms. Walker was independently ambulatory at the time of session. The patient was fully cooperative throughout the session.  Appearance: age appropriate, casually  dressed, well groomed  Behavior/Cooperation: friendly and cooperative  Speech: normal in rate, volume, and tone and appropriate quality, quantity and organization of sentences  Mood: anxious, depressed, sad  Affect: anxious and dysphoric   Thought Process: goal-directed, logical  Thought Content: normal,  No delusions or paranoia; did not appear to be responding to internal stimuli during the session  Orientation: grossly intact  Memory: Grossly intact  Attention Span/Concentration: Attends to session without distraction; reports no difficulty  Fund of Knowledge: average  Estimate of Intelligence: average from verbal skills and history  Cognition: grossly intact  Insight: patient has awareness of illness; good insight into own behavior and behavior of others  Judgment: the patient's behavior is adequate to circumstances    Interval history and content of current session: Patient discussed current adaptation to 's death and changed financial situation. She continues to struggle to get his former employer to honor his  USP/life insurance and health insurance benefits. Reports to be coping with significant difficulty. Evaluated cognitive response, paying particular attention to negative intrusive thoughts of a persistent and detrimental nature. Thoughts of this type are in evidence with severe distress. Identified and evaluated psychosocial and environmental stressors.  Most prominent stressor (other than finances) is loneliness. Examined proactive behaviors that may be implemented to minimize or ameliorate psychosocial stressors.  Focus on rebuilding sense of self and self-control.  Volunteer work (reading at [employer] or [employer] or hospital), exercise (walking, yoga), social engagements (Bible study, [employer], [employer]), and creative expression explored (writing her history, sewing).    Patient has lost 14 lbs and saw psychiatric NP (Cymbalta increased to 120 mg/day, topamax changed to restoril 15 mg; 7/10/19)      Risk parameters:   Patient reports no suicidal ideation  Patient reports no homicidal ideation  Patient reports no self-injurious behavior  Patient reports no violent behavior   Safety needs:  None at this time      Verbal deficits: None     Patient's response to intervention:The patient's response to intervention is motivated.     Progress toward goals and other mental status changes:  The patient's progress toward goals is good.      Progress to date:Progress as Expected      Goals from last visit: Met      Patient reported outcomes: not completed on this date     Distress Thermometer:   Distress Score             Practical Problems Physical Problems                                                   Family Problems                                         Emotional Problems                                                         Spiritual/Religions Concerns               Other Problems              PHQ-9=  not completed on this date JOSE ENRIQUE-7= not completed on this date     Client Strengths: verbal,  intelligent, successful, adeqeate social support, good insight, commitment to wellness, strong cezar, strong cultural traditions      Treatment Plan:individual psychotherapy and medication management by physician  · Target symptoms: depression, anxiety , grief  · Why chosen therapy is appropriate versus another modality: relevant to diagnosis, patient responds to this modality, evidence based practice  · Outcome monitoring methods: self-report, checklist/rating scale  · Therapeutic intervention type: behavior modifying psychotherapy, supportive psychotherapy  · Prognosis: Good      Behavioral goals:    Exercise: daily walking as per her cardiologist   Stress management:  Yoga class   Social engagement: time with friends ([employer])   Nutrition:   Smoking Cessation:   Therapy: volunteer    CPAP    Return to clinic: 1 month, sooner if needed     Length of Service (minutes direct face-to-face contact): 45      Each patient provided medical services by telemedicine is:  (1) informed of the relationship between the physician and patient and the respective role of any other health care provider with respect to management of the patient; and (2) notified that he or she may decline to receive medical services by telemedicine and may withdraw from such care at any time      Han Graf, PhD  [place] License #[id number]

## 2019-07-14 ENCOUNTER — PATIENT MESSAGE (OUTPATIENT)
Dept: HEMATOLOGY/ONCOLOGY | Facility: CLINIC | Age: 64
End: 2019-07-14

## 2019-07-15 DIAGNOSIS — R63.4 WEIGHT LOSS: Primary | ICD-10-CM

## 2019-07-15 DIAGNOSIS — Z17.0 MALIGNANT NEOPLASM OF OVERLAPPING SITES OF LEFT BREAST IN FEMALE, ESTROGEN RECEPTOR POSITIVE: ICD-10-CM

## 2019-07-15 DIAGNOSIS — C50.812 MALIGNANT NEOPLASM OF OVERLAPPING SITES OF LEFT BREAST IN FEMALE, ESTROGEN RECEPTOR POSITIVE: ICD-10-CM

## 2019-07-16 ENCOUNTER — PATIENT MESSAGE (OUTPATIENT)
Dept: HEMATOLOGY/ONCOLOGY | Facility: CLINIC | Age: 64
End: 2019-07-16

## 2019-07-16 ENCOUNTER — TELEPHONE (OUTPATIENT)
Dept: HEMATOLOGY/ONCOLOGY | Facility: CLINIC | Age: 64
End: 2019-07-16

## 2019-07-16 DIAGNOSIS — R19.4 CHANGE IN BOWEL HABITS: ICD-10-CM

## 2019-07-16 DIAGNOSIS — M89.8X9 CANCER TREATMENT INDUCED BONE LOSS: ICD-10-CM

## 2019-07-16 DIAGNOSIS — R63.4 WEIGHT LOSS: ICD-10-CM

## 2019-07-16 DIAGNOSIS — C50.812 MALIGNANT NEOPLASM OF OVERLAPPING SITES OF LEFT BREAST IN FEMALE, ESTROGEN RECEPTOR POSITIVE: Primary | ICD-10-CM

## 2019-07-16 DIAGNOSIS — T45.1X5A CANCER TREATMENT INDUCED BONE LOSS: ICD-10-CM

## 2019-07-16 DIAGNOSIS — Z17.0 MALIGNANT NEOPLASM OF OVERLAPPING SITES OF LEFT BREAST IN FEMALE, ESTROGEN RECEPTOR POSITIVE: Primary | ICD-10-CM

## 2019-07-16 NOTE — TELEPHONE ENCOUNTER
----- Message from Talat Doherty sent at 7/16/2019 10:42 AM CDT -----  Contact: POORNIMA WILKINS [[id number]]  Name of Who is Calling: POORNIMA WILKINS [[id number]]      What is the request in detail: Would like to speak with staff in regards to getting her CT orders and bone density orders sent to [facility] ph# [phone]. Patient also would like to have labs go to [facility] ph: [phone]. Patient will try to obtain fax numbers. Would like to know if this will be done before she cancels her appointment.       Can the clinic reply by [facility]: yes      What Number to Call Back if not in [facility]: [phone]

## 2019-07-17 ENCOUNTER — PATIENT MESSAGE (OUTPATIENT)
Dept: HEMATOLOGY/ONCOLOGY | Facility: CLINIC | Age: 64
End: 2019-07-17

## 2019-07-17 ENCOUNTER — TELEPHONE (OUTPATIENT)
Dept: HEMATOLOGY/ONCOLOGY | Facility: CLINIC | Age: 64
End: 2019-07-17

## 2019-07-17 NOTE — TELEPHONE ENCOUNTER
----- Message from Treva Nath sent at 7/17/2019 11:46 AM CDT -----  Contact: pedro from [facility] imdina vasquez from [facility] called to speak with nurse about a auth   Callback#[phone]  Thank  You  JHOANA Nath

## 2019-07-17 NOTE — TELEPHONE ENCOUNTER
Returned call and spoke with Malissa from [facility]. Malissa calling to request a PA for Ct scan scheduled and Bone Density Spine and Hip. Informed Malissa I will call back once the PA has been received.

## 2019-07-18 NOTE — TELEPHONE ENCOUNTER
Called [employer] [facility] @ [phone], Spoke with Edyta LEON @ 11:15 am on 07/18/2019. Calling to request authorization for Ct Scan of Abdomen/Pelvis  CPT  [id number] Bone Density CPT 43525. Per Edyta, no authorization required to the above tests. I called and spoke with  Helena from [facility]. Informed of the above. Helena will contact Ms Walker and discuss scheduling the above appointments.

## 2019-07-22 ENCOUNTER — PATIENT MESSAGE (OUTPATIENT)
Dept: HEMATOLOGY/ONCOLOGY | Facility: CLINIC | Age: 64
End: 2019-07-22

## 2019-08-18 ENCOUNTER — PATIENT MESSAGE (OUTPATIENT)
Dept: HEMATOLOGY/ONCOLOGY | Facility: CLINIC | Age: 64
End: 2019-08-18

## 2019-08-20 ENCOUNTER — TELEPHONE (OUTPATIENT)
Dept: HEMATOLOGY/ONCOLOGY | Facility: CLINIC | Age: 64
End: 2019-08-20

## 2019-09-02 ENCOUNTER — PATIENT MESSAGE (OUTPATIENT)
Dept: PSYCHIATRY | Facility: CLINIC | Age: 64
End: 2019-09-02

## 2019-12-18 ENCOUNTER — TELEPHONE (OUTPATIENT)
Dept: HEMATOLOGY/ONCOLOGY | Facility: CLINIC | Age: 64
End: 2019-12-18

## 2019-12-18 NOTE — TELEPHONE ENCOUNTER
Returned call and spoke with Ms Walker. Ms Walker scheduled for her f/u with Dr Rios on 12/31/2019. She is unable to keep that appointment. She's calling to reschedule to another day. Done. Appointments rescheduled to Jan 2nd--labs, Dr Rios and Arsh. All appointment information mailed to patient.

## 2019-12-18 NOTE — TELEPHONE ENCOUNTER
----- Message from Melecio Serrato sent at 12/18/2019 11:02 AM CST -----  Contact: pt  Calling to cancel/reschedule 12/31 appt with Dr. Rios    Call back: [phone]

## 2019-12-23 ENCOUNTER — TELEPHONE (OUTPATIENT)
Dept: HEMATOLOGY/ONCOLOGY | Facility: CLINIC | Age: 64
End: 2019-12-23

## 2019-12-23 NOTE — TELEPHONE ENCOUNTER
----- Message from Miles Serrato sent at 12/23/2019  9:07 AM CST -----  Contact: Pt  Pt would like to be called back asap regarding canceling her 1/3/20 appt..  Pt would like to discuss further.    Pt can be reached at [phone].    Thanks

## 2019-12-23 NOTE — TELEPHONE ENCOUNTER
Returned call and spoke with Ms Walker. Ms Walker calling to cancel her appointment scheduled on 01/03/20 with Dr Rios. Ms Walker states she is unable to find someone to drive with her to her appointment. She is unable to drive the distance from [place] to [facility] by herself. Her friend that accompanied her before to her last appointment is no longer able to do so. She states she will have to transfer her care to facility closer to her home. She wanted to tell Dr Rios she was so sorry she has to transfer her care to another MD. Hopefully, she will be able to return to [facility] soon.

## 2021-05-12 ENCOUNTER — PATIENT MESSAGE (OUTPATIENT)
Dept: RESEARCH | Facility: HOSPITAL | Age: 66
End: 2021-05-12

## 2021-10-04 ENCOUNTER — HISTORICAL (OUTPATIENT)
Dept: ADMINISTRATIVE | Facility: HOSPITAL | Age: 66
End: 2021-10-04

## 2021-10-07 LAB — FINAL CULTURE: NORMAL

## 2022-04-10 ENCOUNTER — HISTORICAL (OUTPATIENT)
Dept: ADMINISTRATIVE | Facility: HOSPITAL | Age: 67
End: 2022-04-10

## 2022-04-24 VITALS
DIASTOLIC BLOOD PRESSURE: 66 MMHG | HEIGHT: 64 IN | WEIGHT: 257.94 LBS | BODY MASS INDEX: 44.04 KG/M2 | OXYGEN SATURATION: 98 % | SYSTOLIC BLOOD PRESSURE: 116 MMHG

## (undated) DEVICE — BANDAGE ADHESIVE